# Patient Record
Sex: FEMALE | Race: WHITE | NOT HISPANIC OR LATINO
[De-identification: names, ages, dates, MRNs, and addresses within clinical notes are randomized per-mention and may not be internally consistent; named-entity substitution may affect disease eponyms.]

---

## 2017-01-09 ENCOUNTER — TRANSCRIPTION ENCOUNTER (OUTPATIENT)
Age: 32
End: 2017-01-09

## 2017-05-25 ENCOUNTER — OUTPATIENT (OUTPATIENT)
Dept: OUTPATIENT SERVICES | Facility: HOSPITAL | Age: 32
LOS: 1 days | End: 2017-05-25
Payer: MEDICAID

## 2017-05-25 DIAGNOSIS — Z3A.00 WEEKS OF GESTATION OF PREGNANCY NOT SPECIFIED: ICD-10-CM

## 2017-05-25 DIAGNOSIS — O26.899 OTHER SPECIFIED PREGNANCY RELATED CONDITIONS, UNSPECIFIED TRIMESTER: ICD-10-CM

## 2017-05-25 PROCEDURE — 99213 OFFICE O/P EST LOW 20 MIN: CPT | Mod: 25

## 2017-05-25 PROCEDURE — 76815 OB US LIMITED FETUS(S): CPT | Mod: 26

## 2017-05-25 RX ADMIN — Medication 12 MILLIGRAM(S): at 20:05

## 2017-05-26 ENCOUNTER — OUTPATIENT (OUTPATIENT)
Dept: OUTPATIENT SERVICES | Facility: HOSPITAL | Age: 32
LOS: 1 days | End: 2017-05-26

## 2017-05-26 DIAGNOSIS — O26.899 OTHER SPECIFIED PREGNANCY RELATED CONDITIONS, UNSPECIFIED TRIMESTER: ICD-10-CM

## 2017-05-26 DIAGNOSIS — Z3A.00 WEEKS OF GESTATION OF PREGNANCY NOT SPECIFIED: ICD-10-CM

## 2017-05-26 RX ADMIN — Medication 12 MILLIGRAM(S): at 18:37

## 2017-07-06 ENCOUNTER — INPATIENT (INPATIENT)
Facility: HOSPITAL | Age: 32
LOS: 1 days | Discharge: HOME | End: 2017-07-08
Attending: SPECIALIST | Admitting: SPECIALIST

## 2017-07-06 ENCOUNTER — TRANSCRIPTION ENCOUNTER (OUTPATIENT)
Age: 32
End: 2017-07-06

## 2017-07-06 VITALS — TEMPERATURE: 98 F | RESPIRATION RATE: 16 BRPM | DIASTOLIC BLOOD PRESSURE: 57 MMHG | SYSTOLIC BLOOD PRESSURE: 111 MMHG

## 2017-07-06 DIAGNOSIS — O26.899 OTHER SPECIFIED PREGNANCY RELATED CONDITIONS, UNSPECIFIED TRIMESTER: ICD-10-CM

## 2017-07-06 DIAGNOSIS — Z3A.00 WEEKS OF GESTATION OF PREGNANCY NOT SPECIFIED: ICD-10-CM

## 2017-07-06 LAB
BASOPHILS # BLD AUTO: 0.03 K/UL — SIGNIFICANT CHANGE UP (ref 0–0.2)
BASOPHILS NFR BLD AUTO: 0.4 % — SIGNIFICANT CHANGE UP (ref 0–2)
BLD GP AB SCN SERPL QL: NEGATIVE — SIGNIFICANT CHANGE UP
EOSINOPHIL # BLD AUTO: 0.01 K/UL — SIGNIFICANT CHANGE UP (ref 0–0.5)
EOSINOPHIL NFR BLD AUTO: 0.1 % — SIGNIFICANT CHANGE UP (ref 0–6)
HCT VFR BLD CALC: 29.9 % — LOW (ref 34.5–45)
HGB BLD-MCNC: 9.9 G/DL — LOW (ref 11.5–15.5)
IMM GRANULOCYTES # BLD AUTO: 0.04 # — SIGNIFICANT CHANGE UP
IMM GRANULOCYTES NFR BLD AUTO: 0.5 % — SIGNIFICANT CHANGE UP (ref 0–1.5)
LYMPHOCYTES # BLD AUTO: 1.37 K/UL — SIGNIFICANT CHANGE UP (ref 1–3.3)
LYMPHOCYTES # BLD AUTO: 17.8 % — SIGNIFICANT CHANGE UP (ref 13–44)
MCHC RBC-ENTMCNC: 29.7 PG — SIGNIFICANT CHANGE UP (ref 27–34)
MCHC RBC-ENTMCNC: 33.1 % — SIGNIFICANT CHANGE UP (ref 32–36)
MCV RBC AUTO: 89.8 FL — SIGNIFICANT CHANGE UP (ref 80–100)
MONOCYTES # BLD AUTO: 0.62 K/UL — SIGNIFICANT CHANGE UP (ref 0–0.9)
MONOCYTES NFR BLD AUTO: 8.1 % — SIGNIFICANT CHANGE UP (ref 2–14)
NEUTROPHILS # BLD AUTO: 5.63 K/UL — SIGNIFICANT CHANGE UP (ref 1.8–7.4)
NEUTROPHILS NFR BLD AUTO: 73.1 % — SIGNIFICANT CHANGE UP (ref 43–77)
NRBC # FLD: 0 — SIGNIFICANT CHANGE UP
PLATELET # BLD AUTO: 196 K/UL — SIGNIFICANT CHANGE UP (ref 150–400)
PMV BLD: 11.6 FL — SIGNIFICANT CHANGE UP (ref 7–13)
RBC # BLD: 3.33 M/UL — LOW (ref 3.8–5.2)
RBC # FLD: 14.1 % — SIGNIFICANT CHANGE UP (ref 10.3–14.5)
RH IG SCN BLD-IMP: POSITIVE — SIGNIFICANT CHANGE UP
WBC # BLD: 7.7 K/UL — SIGNIFICANT CHANGE UP (ref 3.8–10.5)
WBC # FLD AUTO: 7.7 K/UL — SIGNIFICANT CHANGE UP (ref 3.8–10.5)

## 2017-07-06 RX ORDER — DOCUSATE SODIUM 100 MG
100 CAPSULE ORAL
Qty: 0 | Refills: 0 | Status: DISCONTINUED | OUTPATIENT
Start: 2017-07-06 | End: 2017-07-08

## 2017-07-06 RX ORDER — DIPHENHYDRAMINE HCL 50 MG
25 CAPSULE ORAL EVERY 6 HOURS
Qty: 0 | Refills: 0 | Status: DISCONTINUED | OUTPATIENT
Start: 2017-07-06 | End: 2017-07-08

## 2017-07-06 RX ORDER — MAGNESIUM HYDROXIDE 400 MG/1
30 TABLET, CHEWABLE ORAL
Qty: 0 | Refills: 0 | Status: DISCONTINUED | OUTPATIENT
Start: 2017-07-06 | End: 2017-07-08

## 2017-07-06 RX ORDER — OXYCODONE HYDROCHLORIDE 5 MG/1
5 TABLET ORAL
Qty: 0 | Refills: 0 | Status: DISCONTINUED | OUTPATIENT
Start: 2017-07-06 | End: 2017-07-08

## 2017-07-06 RX ORDER — DIBUCAINE 1 %
1 OINTMENT (GRAM) RECTAL EVERY 4 HOURS
Qty: 0 | Refills: 0 | Status: DISCONTINUED | OUTPATIENT
Start: 2017-07-06 | End: 2017-07-08

## 2017-07-06 RX ORDER — HYDROCORTISONE 1 %
1 OINTMENT (GRAM) TOPICAL EVERY 4 HOURS
Qty: 0 | Refills: 0 | Status: DISCONTINUED | OUTPATIENT
Start: 2017-07-06 | End: 2017-07-06

## 2017-07-06 RX ORDER — OXYTOCIN 10 UNIT/ML
41.67 VIAL (ML) INJECTION
Qty: 20 | Refills: 0 | Status: DISCONTINUED | OUTPATIENT
Start: 2017-07-06 | End: 2017-07-07

## 2017-07-06 RX ORDER — ACETAMINOPHEN 500 MG
3 TABLET ORAL
Qty: 0 | Refills: 0 | DISCHARGE
Start: 2017-07-06

## 2017-07-06 RX ORDER — ACETAMINOPHEN 500 MG
975 TABLET ORAL EVERY 6 HOURS
Qty: 0 | Refills: 0 | Status: DISCONTINUED | OUTPATIENT
Start: 2017-07-06 | End: 2017-07-08

## 2017-07-06 RX ORDER — IBUPROFEN 200 MG
600 TABLET ORAL EVERY 6 HOURS
Qty: 0 | Refills: 0 | Status: DISCONTINUED | OUTPATIENT
Start: 2017-07-06 | End: 2017-07-08

## 2017-07-06 RX ORDER — OXYCODONE HYDROCHLORIDE 5 MG/1
5 TABLET ORAL EVERY 4 HOURS
Qty: 0 | Refills: 0 | Status: DISCONTINUED | OUTPATIENT
Start: 2017-07-06 | End: 2017-07-08

## 2017-07-06 RX ORDER — SODIUM CHLORIDE 9 MG/ML
3 INJECTION INTRAMUSCULAR; INTRAVENOUS; SUBCUTANEOUS EVERY 8 HOURS
Qty: 0 | Refills: 0 | Status: DISCONTINUED | OUTPATIENT
Start: 2017-07-06 | End: 2017-07-06

## 2017-07-06 RX ORDER — SIMETHICONE 80 MG/1
80 TABLET, CHEWABLE ORAL EVERY 6 HOURS
Qty: 0 | Refills: 0 | Status: DISCONTINUED | OUTPATIENT
Start: 2017-07-06 | End: 2017-07-08

## 2017-07-06 RX ORDER — PRAMOXINE HYDROCHLORIDE 150 MG/15G
1 AEROSOL, FOAM RECTAL EVERY 4 HOURS
Qty: 0 | Refills: 0 | Status: DISCONTINUED | OUTPATIENT
Start: 2017-07-06 | End: 2017-07-07

## 2017-07-06 RX ORDER — IBUPROFEN 200 MG
600 TABLET ORAL EVERY 6 HOURS
Qty: 0 | Refills: 0 | Status: COMPLETED | OUTPATIENT
Start: 2017-07-06 | End: 2018-06-04

## 2017-07-06 RX ORDER — ACETAMINOPHEN 500 MG
975 TABLET ORAL EVERY 6 HOURS
Qty: 0 | Refills: 0 | Status: COMPLETED | OUTPATIENT
Start: 2017-07-06 | End: 2018-06-04

## 2017-07-06 RX ORDER — PRAMOXINE HYDROCHLORIDE 150 MG/15G
1 AEROSOL, FOAM RECTAL EVERY 4 HOURS
Qty: 0 | Refills: 0 | Status: DISCONTINUED | OUTPATIENT
Start: 2017-07-06 | End: 2017-07-06

## 2017-07-06 RX ORDER — AER TRAVELER 0.5 G/1
1 SOLUTION RECTAL; TOPICAL EVERY 4 HOURS
Qty: 0 | Refills: 0 | Status: DISCONTINUED | OUTPATIENT
Start: 2017-07-06 | End: 2017-07-06

## 2017-07-06 RX ORDER — KETOROLAC TROMETHAMINE 30 MG/ML
30 SYRINGE (ML) INJECTION ONCE
Qty: 0 | Refills: 0 | Status: DISCONTINUED | OUTPATIENT
Start: 2017-07-06 | End: 2017-07-06

## 2017-07-06 RX ORDER — OXYTOCIN 10 UNIT/ML
41.67 VIAL (ML) INJECTION
Qty: 20 | Refills: 0 | Status: DISCONTINUED | OUTPATIENT
Start: 2017-07-06 | End: 2017-07-06

## 2017-07-06 RX ORDER — SODIUM CHLORIDE 9 MG/ML
3 INJECTION INTRAMUSCULAR; INTRAVENOUS; SUBCUTANEOUS EVERY 8 HOURS
Qty: 0 | Refills: 0 | Status: DISCONTINUED | OUTPATIENT
Start: 2017-07-06 | End: 2017-07-07

## 2017-07-06 RX ORDER — HYDROCORTISONE 1 %
1 OINTMENT (GRAM) TOPICAL EVERY 4 HOURS
Qty: 0 | Refills: 0 | Status: DISCONTINUED | OUTPATIENT
Start: 2017-07-06 | End: 2017-07-07

## 2017-07-06 RX ORDER — LANOLIN
1 OINTMENT (GRAM) TOPICAL EVERY 6 HOURS
Qty: 0 | Refills: 0 | Status: DISCONTINUED | OUTPATIENT
Start: 2017-07-06 | End: 2017-07-08

## 2017-07-06 RX ORDER — GLYCERIN ADULT
1 SUPPOSITORY, RECTAL RECTAL AT BEDTIME
Qty: 0 | Refills: 0 | Status: DISCONTINUED | OUTPATIENT
Start: 2017-07-06 | End: 2017-07-08

## 2017-07-06 RX ORDER — TETANUS TOXOID, REDUCED DIPHTHERIA TOXOID AND ACELLULAR PERTUSSIS VACCINE, ADSORBED 5; 2.5; 8; 8; 2.5 [IU]/.5ML; [IU]/.5ML; UG/.5ML; UG/.5ML; UG/.5ML
0.5 SUSPENSION INTRAMUSCULAR ONCE
Qty: 0 | Refills: 0 | Status: DISCONTINUED | OUTPATIENT
Start: 2017-07-06 | End: 2017-07-08

## 2017-07-06 RX ORDER — AER TRAVELER 0.5 G/1
1 SOLUTION RECTAL; TOPICAL EVERY 4 HOURS
Qty: 0 | Refills: 0 | Status: DISCONTINUED | OUTPATIENT
Start: 2017-07-06 | End: 2017-07-08

## 2017-07-06 RX ORDER — DIBUCAINE 1 %
1 OINTMENT (GRAM) RECTAL EVERY 4 HOURS
Qty: 0 | Refills: 0 | Status: DISCONTINUED | OUTPATIENT
Start: 2017-07-06 | End: 2017-07-06

## 2017-07-06 RX ADMIN — Medication 30 MILLIGRAM(S): at 21:05

## 2017-07-06 RX ADMIN — Medication 125 MILLIUNIT(S)/MIN: at 20:56

## 2017-07-06 RX ADMIN — Medication 30 MILLIGRAM(S): at 20:55

## 2017-07-06 RX ADMIN — Medication 975 MILLIGRAM(S): at 23:54

## 2017-07-06 NOTE — DISCHARGE NOTE OB - CARE PROVIDER_API CALL
Nereida Paris (MD), Obstetrics and Gynecology  [address] [place] [id number]  Phone: [phone]  Fax: [phone]

## 2017-07-06 NOTE — DISCHARGE NOTE OB - PATIENT PORTAL LINK FT
“You can access the FollowHealth Patient Portal, offered by [facility], by registering with the following website: [email or web address]”

## 2017-07-06 NOTE — DISCHARGE NOTE OB - MEDICATION SUMMARY - MEDICATIONS TO STOP TAKING
I will STOP taking the medications listed below when I get home from the hospital:    ibuprofen 200 mg oral tablet  -- 3 tab(s) by mouth every 6 hours, As Needed

## 2017-07-06 NOTE — PROVIDER CONTACT NOTE (OTHER) - ASSESSMENT
vss fundus firm lochia moderate oob to void, voided 100cc medicated for pain, passed on blood clot after voiding

## 2017-07-06 NOTE — DISCHARGE NOTE OB - MATERIALS PROVIDED
Guide to Postpartum Care/[facility] [place] Screening Program/Vaccinations/Birth Certificate Instructions/Shaken Baby Prevention Handout

## 2017-07-07 LAB — T PALLIDUM AB TITR SER: NEGATIVE — SIGNIFICANT CHANGE UP

## 2017-07-07 RX ORDER — PRAMOXINE HYDROCHLORIDE 150 MG/15G
1 AEROSOL, FOAM RECTAL EVERY 4 HOURS
Qty: 0 | Refills: 0 | Status: DISCONTINUED | OUTPATIENT
Start: 2017-07-07 | End: 2017-07-08

## 2017-07-07 RX ORDER — HYDROCORTISONE 1 %
1 OINTMENT (GRAM) TOPICAL EVERY 4 HOURS
Qty: 0 | Refills: 0 | Status: DISCONTINUED | OUTPATIENT
Start: 2017-07-07 | End: 2017-07-08

## 2017-07-07 RX ADMIN — Medication 600 MILLIGRAM(S): at 17:32

## 2017-07-07 RX ADMIN — Medication 975 MILLIGRAM(S): at 14:30

## 2017-07-07 RX ADMIN — Medication 975 MILLIGRAM(S): at 06:28

## 2017-07-07 RX ADMIN — Medication 975 MILLIGRAM(S): at 01:00

## 2017-07-07 RX ADMIN — Medication 975 MILLIGRAM(S): at 13:23

## 2017-07-07 RX ADMIN — Medication 975 MILLIGRAM(S): at 07:40

## 2017-07-07 RX ADMIN — Medication 1 TABLET(S): at 13:23

## 2017-07-07 NOTE — PROGRESS NOTE ADULT - SUBJECTIVE AND OBJECTIVE BOX
S: Patient doing well. Minimal lochia. Pain controlled.    O: Vital Signs Last 24 Hrs  T(C): 36.7 (2017 05:49), Max: 37 (2017 22:30)  T(F): 98.1 (2017 05:49), Max: 98.6 (2017 22:30)  HR: 60 (2017 05:49) (60 - 72)  BP: 98/61 (2017 05:49) (97/53 - 116/53)  BP(mean): --  RR: 18 (2017 05:49) (16 - 18)  SpO2: 98% (2017 05:49) (98% - 100%)    Gen: NAD  Abd: soft, NT, ND, fundus firm below umbilicus  Lochia: moderate  Ext: no tenderness    Labs:                        9.9    7.70  )-----------( 196      ( 2017 15:40 )             29.9       A: 32y PPD#1 s/p  doing well.    Plan:Routine care d/c this pm with instructions

## 2017-07-08 VITALS
HEART RATE: 87 BPM | SYSTOLIC BLOOD PRESSURE: 124 MMHG | OXYGEN SATURATION: 99 % | TEMPERATURE: 98 F | DIASTOLIC BLOOD PRESSURE: 76 MMHG | RESPIRATION RATE: 16 BRPM

## 2017-07-08 RX ADMIN — Medication 1 TABLET(S): at 12:12

## 2017-07-08 RX ADMIN — Medication 100 MILLIGRAM(S): at 12:13

## 2017-07-08 RX ADMIN — Medication 600 MILLIGRAM(S): at 05:37

## 2017-07-08 RX ADMIN — Medication 600 MILLIGRAM(S): at 06:28

## 2017-08-23 ENCOUNTER — TRANSCRIPTION ENCOUNTER (OUTPATIENT)
Age: 32
End: 2017-08-23

## 2017-10-25 ENCOUNTER — RESULT REVIEW (OUTPATIENT)
Age: 32
End: 2017-10-25

## 2018-10-25 ENCOUNTER — RESULT REVIEW (OUTPATIENT)
Age: 33
End: 2018-10-25

## 2019-03-05 ENCOUNTER — TRANSCRIPTION ENCOUNTER (OUTPATIENT)
Age: 34
End: 2019-03-05

## 2019-05-28 ENCOUNTER — ASOB RESULT (OUTPATIENT)
Age: 34
End: 2019-05-28

## 2019-05-28 ENCOUNTER — APPOINTMENT (OUTPATIENT)
Dept: ANTEPARTUM | Facility: CLINIC | Age: 34
End: 2019-05-28
Payer: MEDICAID

## 2019-05-28 PROCEDURE — 76801 OB US < 14 WKS SINGLE FETUS: CPT

## 2019-05-28 PROCEDURE — 99201 OFFICE OUTPATIENT NEW 10 MINUTES: CPT | Mod: 25

## 2019-06-26 ENCOUNTER — APPOINTMENT (OUTPATIENT)
Dept: ANTEPARTUM | Facility: CLINIC | Age: 34
End: 2019-06-26
Payer: MEDICAID

## 2019-06-26 ENCOUNTER — ASOB RESULT (OUTPATIENT)
Age: 34
End: 2019-06-26

## 2019-06-26 PROCEDURE — 76817 TRANSVAGINAL US OBSTETRIC: CPT

## 2019-07-09 ENCOUNTER — APPOINTMENT (OUTPATIENT)
Dept: ANTEPARTUM | Facility: CLINIC | Age: 34
End: 2019-07-09
Payer: MEDICAID

## 2019-07-09 ENCOUNTER — ASOB RESULT (OUTPATIENT)
Age: 34
End: 2019-07-09

## 2019-07-09 PROCEDURE — 76811 OB US DETAILED SNGL FETUS: CPT

## 2019-07-09 PROCEDURE — 76817 TRANSVAGINAL US OBSTETRIC: CPT

## 2019-07-23 ENCOUNTER — ASOB RESULT (OUTPATIENT)
Age: 34
End: 2019-07-23

## 2019-07-23 ENCOUNTER — APPOINTMENT (OUTPATIENT)
Dept: ANTEPARTUM | Facility: CLINIC | Age: 34
End: 2019-07-23
Payer: MEDICAID

## 2019-07-23 ENCOUNTER — APPOINTMENT (OUTPATIENT)
Dept: ANTEPARTUM | Facility: CLINIC | Age: 34
End: 2019-07-23

## 2019-07-23 PROCEDURE — 76816 OB US FOLLOW-UP PER FETUS: CPT

## 2019-07-25 ENCOUNTER — APPOINTMENT (OUTPATIENT)
Dept: ANTEPARTUM | Facility: CLINIC | Age: 34
End: 2019-07-25

## 2019-07-26 ENCOUNTER — APPOINTMENT (OUTPATIENT)
Dept: ANTEPARTUM | Facility: CLINIC | Age: 34
End: 2019-07-26

## 2019-09-09 ENCOUNTER — APPOINTMENT (OUTPATIENT)
Dept: MATERNAL FETAL MEDICINE | Facility: CLINIC | Age: 34
End: 2019-09-09
Payer: MEDICAID

## 2019-09-09 ENCOUNTER — ASOB RESULT (OUTPATIENT)
Age: 34
End: 2019-09-09

## 2019-09-09 DIAGNOSIS — O24.419 GESTATIONAL DIABETES MELLITUS IN PREGNANCY, UNSPECIFIED CONTROL: ICD-10-CM

## 2019-09-09 PROCEDURE — G0108 DIAB MANAGE TRN  PER INDIV: CPT

## 2019-09-09 RX ORDER — BLOOD SUGAR DIAGNOSTIC
STRIP MISCELLANEOUS 4 TIMES DAILY
Qty: 1 | Refills: 2 | Status: ACTIVE | COMMUNITY
Start: 2019-09-09 | End: 1900-01-01

## 2019-09-09 RX ORDER — BLOOD-GLUCOSE METER
W/DEVICE KIT MISCELLANEOUS
Qty: 1 | Refills: 0 | Status: ACTIVE | COMMUNITY
Start: 2019-09-09 | End: 1900-01-01

## 2019-09-09 RX ORDER — LANCETS 28 GAUGE
EACH MISCELLANEOUS
Qty: 1 | Refills: 2 | Status: ACTIVE | COMMUNITY
Start: 2019-09-09 | End: 1900-01-01

## 2019-09-23 ENCOUNTER — ASOB RESULT (OUTPATIENT)
Age: 34
End: 2019-09-23

## 2019-09-23 ENCOUNTER — APPOINTMENT (OUTPATIENT)
Dept: ANTEPARTUM | Facility: CLINIC | Age: 34
End: 2019-09-23
Payer: MEDICAID

## 2019-09-23 ENCOUNTER — APPOINTMENT (OUTPATIENT)
Dept: MATERNAL FETAL MEDICINE | Facility: CLINIC | Age: 34
End: 2019-09-23
Payer: MEDICAID

## 2019-09-23 PROCEDURE — 76805 OB US >/= 14 WKS SNGL FETUS: CPT

## 2019-09-23 PROCEDURE — 76819 FETAL BIOPHYS PROFIL W/O NST: CPT

## 2019-09-23 PROCEDURE — 99213 OFFICE O/P EST LOW 20 MIN: CPT | Mod: 25

## 2019-09-23 PROCEDURE — G0108 DIAB MANAGE TRN  PER INDIV: CPT

## 2019-10-14 ENCOUNTER — APPOINTMENT (OUTPATIENT)
Dept: ANTEPARTUM | Facility: CLINIC | Age: 34
End: 2019-10-14

## 2019-10-16 ENCOUNTER — APPOINTMENT (OUTPATIENT)
Dept: ANTEPARTUM | Facility: CLINIC | Age: 34
End: 2019-10-16

## 2019-10-16 ENCOUNTER — APPOINTMENT (OUTPATIENT)
Dept: MATERNAL FETAL MEDICINE | Facility: CLINIC | Age: 34
End: 2019-10-16

## 2019-11-05 ENCOUNTER — APPOINTMENT (OUTPATIENT)
Dept: ANTEPARTUM | Facility: CLINIC | Age: 34
End: 2019-11-05
Payer: MEDICAID

## 2019-11-05 ENCOUNTER — APPOINTMENT (OUTPATIENT)
Dept: MATERNAL FETAL MEDICINE | Facility: CLINIC | Age: 34
End: 2019-11-05
Payer: MEDICAID

## 2019-11-05 ENCOUNTER — ASOB RESULT (OUTPATIENT)
Age: 34
End: 2019-11-05

## 2019-11-05 PROCEDURE — 76819 FETAL BIOPHYS PROFIL W/O NST: CPT

## 2019-11-05 PROCEDURE — 76816 OB US FOLLOW-UP PER FETUS: CPT

## 2019-11-05 PROCEDURE — G0108 DIAB MANAGE TRN  PER INDIV: CPT

## 2019-11-13 ENCOUNTER — EMERGENCY (EMERGENCY)
Facility: HOSPITAL | Age: 34
LOS: 1 days | Discharge: STILL ADMITTED OR ADMINISTRATIVE | End: 2019-11-13
Admitting: EMERGENCY MEDICINE

## 2019-11-13 ENCOUNTER — TRANSCRIPTION ENCOUNTER (OUTPATIENT)
Age: 34
End: 2019-11-13

## 2019-11-13 ENCOUNTER — INPATIENT (INPATIENT)
Facility: HOSPITAL | Age: 34
LOS: 1 days | Discharge: HOME | End: 2019-11-15
Attending: SPECIALIST | Admitting: SPECIALIST

## 2019-11-13 VITALS
SYSTOLIC BLOOD PRESSURE: 112 MMHG | OXYGEN SATURATION: 98 % | HEART RATE: 80 BPM | DIASTOLIC BLOOD PRESSURE: 52 MMHG | TEMPERATURE: 98 F

## 2019-11-13 VITALS — SYSTOLIC BLOOD PRESSURE: 120 MMHG | DIASTOLIC BLOOD PRESSURE: 80 MMHG

## 2019-11-13 LAB
BLD GP AB SCN SERPL QL: NEGATIVE — SIGNIFICANT CHANGE UP
HCT VFR BLD CALC: 32.6 % — LOW (ref 34.5–45)
HGB BLD-MCNC: 11.2 G/DL — LOW (ref 11.5–15.5)
MCHC RBC-ENTMCNC: 33.1 PG — SIGNIFICANT CHANGE UP (ref 27–34)
MCHC RBC-ENTMCNC: 34.4 % — SIGNIFICANT CHANGE UP (ref 32–36)
MCV RBC AUTO: 96.4 FL — SIGNIFICANT CHANGE UP (ref 80–100)
NRBC # FLD: 0 K/UL — SIGNIFICANT CHANGE UP (ref 0–0)
PLATELET # BLD AUTO: 183 K/UL — SIGNIFICANT CHANGE UP (ref 150–400)
PMV BLD: 11.7 FL — SIGNIFICANT CHANGE UP (ref 7–13)
RBC # BLD: 3.38 M/UL — LOW (ref 3.8–5.2)
RBC # FLD: 13.1 % — SIGNIFICANT CHANGE UP (ref 10.3–14.5)
RH IG SCN BLD-IMP: POSITIVE — SIGNIFICANT CHANGE UP
WBC # BLD: 11.55 K/UL — HIGH (ref 3.8–10.5)
WBC # FLD AUTO: 11.55 K/UL — HIGH (ref 3.8–10.5)

## 2019-11-13 RX ORDER — IBUPROFEN 200 MG
600 TABLET ORAL EVERY 6 HOURS
Refills: 0 | Status: COMPLETED | OUTPATIENT
Start: 2019-11-13 | End: 2020-10-11

## 2019-11-13 RX ORDER — LANOLIN
1 OINTMENT (GRAM) TOPICAL EVERY 6 HOURS
Refills: 0 | Status: DISCONTINUED | OUTPATIENT
Start: 2019-11-13 | End: 2019-11-15

## 2019-11-13 RX ORDER — OXYTOCIN 10 UNIT/ML
10 VIAL (ML) INJECTION ONCE
Refills: 0 | Status: COMPLETED | OUTPATIENT
Start: 2019-11-13 | End: 2019-11-13

## 2019-11-13 RX ORDER — GLYCERIN ADULT
1 SUPPOSITORY, RECTAL RECTAL AT BEDTIME
Refills: 0 | Status: DISCONTINUED | OUTPATIENT
Start: 2019-11-13 | End: 2019-11-15

## 2019-11-13 RX ORDER — IBUPROFEN 200 MG
600 TABLET ORAL EVERY 6 HOURS
Refills: 0 | Status: DISCONTINUED | OUTPATIENT
Start: 2019-11-13 | End: 2019-11-15

## 2019-11-13 RX ORDER — HYDROCORTISONE 1 %
1 OINTMENT (GRAM) TOPICAL EVERY 6 HOURS
Refills: 0 | Status: DISCONTINUED | OUTPATIENT
Start: 2019-11-13 | End: 2019-11-15

## 2019-11-13 RX ORDER — PRAMOXINE HYDROCHLORIDE 150 MG/15G
1 AEROSOL, FOAM RECTAL EVERY 4 HOURS
Refills: 0 | Status: DISCONTINUED | OUTPATIENT
Start: 2019-11-13 | End: 2019-11-15

## 2019-11-13 RX ORDER — TETANUS TOXOID, REDUCED DIPHTHERIA TOXOID AND ACELLULAR PERTUSSIS VACCINE, ADSORBED 5; 2.5; 8; 8; 2.5 [IU]/.5ML; [IU]/.5ML; UG/.5ML; UG/.5ML; UG/.5ML
0.5 SUSPENSION INTRAMUSCULAR ONCE
Refills: 0 | Status: DISCONTINUED | OUTPATIENT
Start: 2019-11-13 | End: 2019-11-15

## 2019-11-13 RX ORDER — MAGNESIUM HYDROXIDE 400 MG/1
30 TABLET, CHEWABLE ORAL
Refills: 0 | Status: DISCONTINUED | OUTPATIENT
Start: 2019-11-13 | End: 2019-11-15

## 2019-11-13 RX ORDER — OXYCODONE HYDROCHLORIDE 5 MG/1
5 TABLET ORAL
Refills: 0 | Status: DISCONTINUED | OUTPATIENT
Start: 2019-11-13 | End: 2019-11-15

## 2019-11-13 RX ORDER — SIMETHICONE 80 MG/1
80 TABLET, CHEWABLE ORAL EVERY 4 HOURS
Refills: 0 | Status: DISCONTINUED | OUTPATIENT
Start: 2019-11-13 | End: 2019-11-15

## 2019-11-13 RX ORDER — OXYCODONE HYDROCHLORIDE 5 MG/1
5 TABLET ORAL ONCE
Refills: 0 | Status: DISCONTINUED | OUTPATIENT
Start: 2019-11-13 | End: 2019-11-15

## 2019-11-13 RX ORDER — OXYTOCIN 10 UNIT/ML
333.33 VIAL (ML) INJECTION
Qty: 20 | Refills: 0 | Status: DISCONTINUED | OUTPATIENT
Start: 2019-11-13 | End: 2019-11-14

## 2019-11-13 RX ORDER — DIBUCAINE 1 %
1 OINTMENT (GRAM) RECTAL EVERY 6 HOURS
Refills: 0 | Status: DISCONTINUED | OUTPATIENT
Start: 2019-11-13 | End: 2019-11-15

## 2019-11-13 RX ORDER — AER TRAVELER 0.5 G/1
1 SOLUTION RECTAL; TOPICAL EVERY 4 HOURS
Refills: 0 | Status: DISCONTINUED | OUTPATIENT
Start: 2019-11-13 | End: 2019-11-15

## 2019-11-13 RX ORDER — DIPHENHYDRAMINE HCL 50 MG
25 CAPSULE ORAL EVERY 6 HOURS
Refills: 0 | Status: DISCONTINUED | OUTPATIENT
Start: 2019-11-13 | End: 2019-11-15

## 2019-11-13 RX ORDER — KETOROLAC TROMETHAMINE 30 MG/ML
30 SYRINGE (ML) INJECTION ONCE
Refills: 0 | Status: DISCONTINUED | OUTPATIENT
Start: 2019-11-13 | End: 2019-11-14

## 2019-11-13 RX ORDER — BENZOCAINE 10 %
1 GEL (GRAM) MUCOUS MEMBRANE EVERY 6 HOURS
Refills: 0 | Status: DISCONTINUED | OUTPATIENT
Start: 2019-11-13 | End: 2019-11-15

## 2019-11-13 RX ORDER — ACETAMINOPHEN 500 MG
975 TABLET ORAL
Refills: 0 | Status: DISCONTINUED | OUTPATIENT
Start: 2019-11-13 | End: 2019-11-15

## 2019-11-13 RX ORDER — SODIUM CHLORIDE 9 MG/ML
3 INJECTION INTRAMUSCULAR; INTRAVENOUS; SUBCUTANEOUS EVERY 8 HOURS
Refills: 0 | Status: DISCONTINUED | OUTPATIENT
Start: 2019-11-13 | End: 2019-11-15

## 2019-11-13 RX ADMIN — Medication 975 MILLIGRAM(S): at 20:18

## 2019-11-13 RX ADMIN — Medication 975 MILLIGRAM(S): at 20:51

## 2019-11-13 RX ADMIN — Medication 10 UNIT(S): at 18:58

## 2019-11-13 NOTE — DISCHARGE NOTE OB - CARE PROVIDER_API CALL
Nereida Paris)  Obstetrics and Gynecology  [address] [place] [address]  Phone: [phone]  Fax: [phone]  Follow Up Time:

## 2019-11-13 NOTE — DISCHARGE NOTE OB - PATIENT PORTAL LINK FT
You can access the FollowMyHealth Patient Portal offered by [facility] by registering at the following website: [email or web address]. By joining "[employer]’s FollowMyHealth portal, you will also be able to view your health information using other applications (apps) compatible with our system.

## 2019-11-13 NOTE — DISCHARGE NOTE OB - MATERIALS PROVIDED
[facility] [place] Screening Program/Back To Sleep Handout/Discharge Medication Information for Patients and Families Pocket Guide/  Immunization Record/Breastfeeding Log/Shaken Baby Prevention Handout/Breastfeeding Guide and Packet/Birth Certificate Instructions/Breastfeeding Mother’s Support Group Information/Guide to Postpartum Care/[facility] Hearing Screen Program

## 2019-11-13 NOTE — OB NEONATOLOGY/PEDIATRICIAN DELIVERY SUMMARY - NSPEDSNEONOTESA_OBGYN_ALL_OB_FT
Baby boy GA 36.5 wks via  to  33 yo  B+ mother. No significant maternal or prenatal history. PNL NR/immune/neg. GBS neg  SROM clear at 1700 on . Maternal Tmax unkown. Baby emerged vigorous and crying. Was W/D/SS with Apgars 9,9. Mom would like to breast feed. Consents to Hep B. no circ requested.    This baby was code 100 called to the ED. Mom brought up straight up to L&D and delivered via . No complication during delivery.    :    TOB: 18[birth year]    ADOD:  11/15    PMD:  Dr. Yarely Cerrato Baby boy GA 36.5 wks via  to  33 yo  B+ mother. No significant maternal or prenatal history. PNL NR/immune/neg. GBS neg  SROM clear at 1700 on . Maternal Tmax unknown. Baby emerged vigorous and crying. Was W/D/SS with Apgars 9,9. Mom would like to breast feed. No circ requested. Mom is unsure if she wants Hep B.      code 100 called to the ED for this delivery. Mom brought up straight up to L&D and delivered via  in L&D. No complication during delivery.    :    TOB: 1856    ADOD:  11/15    PMD:  Dr. Yarely Morris

## 2019-11-13 NOTE — OB PROVIDER DELIVERY SUMMARY - NSPROVIDERDELIVERYNOTE_OBGYN_ALL_OB_FT
pt arrived to l and d fd and pushing.  immediate delivery of viable male; spont cry, peds at delivery

## 2019-11-13 NOTE — OB RN DELIVERY SUMMARY - NS_SEPSISRSKCALC_OBGYN_ALL_OB_FT
No temperature has been documented for this patient in CPN or on the OB Flowsheet. Ensure the highest temperature during labor was documented on the OB Flowsheet. EOS calculated successfully. EOS Risk Factor: 0.5/1000 live births ([facility] national incidence); GA=36w2d; Temp=97.5; ROM=1.683; GBS='Negative'; Antibiotics='No antibiotics or any antibiotics < 2 hrs prior to birth'

## 2019-11-14 LAB — T PALLIDUM AB TITR SER: NEGATIVE — SIGNIFICANT CHANGE UP

## 2019-11-14 RX ADMIN — Medication 975 MILLIGRAM(S): at 04:41

## 2019-11-14 RX ADMIN — Medication 975 MILLIGRAM(S): at 10:25

## 2019-11-14 RX ADMIN — Medication 600 MILLIGRAM(S): at 17:09

## 2019-11-14 RX ADMIN — Medication 975 MILLIGRAM(S): at 05:23

## 2019-11-14 RX ADMIN — Medication 1 TABLET(S): at 09:12

## 2019-11-14 RX ADMIN — Medication 975 MILLIGRAM(S): at 09:46

## 2019-11-14 RX ADMIN — Medication 600 MILLIGRAM(S): at 09:47

## 2019-11-14 RX ADMIN — Medication 600 MILLIGRAM(S): at 01:19

## 2019-11-14 RX ADMIN — Medication 600 MILLIGRAM(S): at 09:12

## 2019-11-14 RX ADMIN — Medication 600 MILLIGRAM(S): at 02:00

## 2019-11-14 NOTE — PROGRESS NOTE ADULT - SUBJECTIVE AND OBJECTIVE BOX
S: Patient doing well. Minimal lochia. Pain controlled.    O: Vital Signs Last 24 Hrs  T(C): 36.7 (2019 05:56), Max: 37 (2019 23:00)  T(F): 98.1 (2019 05:56), Max: 98.6 (2019 23:00)  HR: 64 (2019 05:56) (61 - 109)  BP: 101/52 (2019 05:56) (89/41 - 162/64)  BP(mean): --  RR: 17 (2019 05:56) (12 - 17)  SpO2: 99% (2019 05:56) (97% - 100%)    Gen: NAD  Abd: soft, NT, ND, fundus firm below umbilicus  Lochia: moderate  Ext: no tenderness    Labs:                        11.2   11.55 )-----------( 183      ( 2019 19:20 )             32.6       A: 34y PPD#1 s/p  doing well.  Plan: Routine care DC with instructions for 11/15

## 2019-11-14 NOTE — LACTATION INITIAL EVALUATION - INTERVENTION OUTCOME
nbn  sliding  off left  nipple  . nbn nursing  deeper  on rt  nipple  but  also  slides  off after  few minutes  . enclouraged  to support  breast  and  bring  nbn  in  deep after  wide open  mouth .   and mother  expressing  and  giving  expressed   after  feedings./verbalizes understanding

## 2019-11-14 NOTE — LACTATION INITIAL EVALUATION - LACTATION INTERVENTIONS
initiate skin to skin/initiate dual electric pump routine/assisted with deep latch and positioning .discussed  signs  of  effective  feeding and  swallowing.  discussed  compression at  breast when  nbn  stops  drinking  and  is  still sucking.  instructed  to offer both  breast at a feeding ,feed on cue and safe  skin to skin.   reviewed late   behavior. instructed  to feed  expressed  milkafter  feeding  as nbn  not  effecticve  at  latch

## 2019-11-15 VITALS
DIASTOLIC BLOOD PRESSURE: 54 MMHG | TEMPERATURE: 98 F | RESPIRATION RATE: 17 BRPM | OXYGEN SATURATION: 99 % | SYSTOLIC BLOOD PRESSURE: 104 MMHG | HEART RATE: 55 BPM

## 2019-11-15 RX ADMIN — Medication 975 MILLIGRAM(S): at 08:15

## 2019-11-15 RX ADMIN — Medication 975 MILLIGRAM(S): at 07:17

## 2019-11-25 ENCOUNTER — APPOINTMENT (OUTPATIENT)
Dept: MATERNAL FETAL MEDICINE | Facility: CLINIC | Age: 34
End: 2019-11-25

## 2019-11-25 ENCOUNTER — APPOINTMENT (OUTPATIENT)
Dept: ANTEPARTUM | Facility: CLINIC | Age: 34
End: 2019-11-25

## 2020-07-31 ENCOUNTER — RESULT REVIEW (OUTPATIENT)
Age: 35
End: 2020-07-31

## 2020-08-20 ENCOUNTER — APPOINTMENT (OUTPATIENT)
Dept: ANTEPARTUM | Facility: CLINIC | Age: 35
End: 2020-08-20

## 2020-09-29 ENCOUNTER — ASOB RESULT (OUTPATIENT)
Age: 35
End: 2020-09-29

## 2020-09-29 ENCOUNTER — APPOINTMENT (OUTPATIENT)
Dept: ANTEPARTUM | Facility: CLINIC | Age: 35
End: 2020-09-29
Payer: MEDICAID

## 2020-09-29 PROCEDURE — 99204 OFFICE O/P NEW MOD 45 MIN: CPT | Mod: 95

## 2020-10-07 ENCOUNTER — ASOB RESULT (OUTPATIENT)
Age: 35
End: 2020-10-07

## 2020-10-07 ENCOUNTER — APPOINTMENT (OUTPATIENT)
Dept: ANTEPARTUM | Facility: CLINIC | Age: 35
End: 2020-10-07
Payer: MEDICAID

## 2020-10-07 PROCEDURE — 76817 TRANSVAGINAL US OBSTETRIC: CPT

## 2020-10-07 PROCEDURE — 76815 OB US LIMITED FETUS(S): CPT

## 2020-10-27 ENCOUNTER — APPOINTMENT (OUTPATIENT)
Dept: ANTEPARTUM | Facility: CLINIC | Age: 35
End: 2020-10-27
Payer: MEDICAID

## 2020-10-27 ENCOUNTER — ASOB RESULT (OUTPATIENT)
Age: 35
End: 2020-10-27

## 2020-10-27 ENCOUNTER — APPOINTMENT (OUTPATIENT)
Dept: ANTEPARTUM | Facility: CLINIC | Age: 35
End: 2020-10-27

## 2020-10-27 PROCEDURE — 76817 TRANSVAGINAL US OBSTETRIC: CPT

## 2020-10-27 PROCEDURE — 99072 ADDL SUPL MATRL&STAF TM PHE: CPT

## 2020-10-27 PROCEDURE — 76811 OB US DETAILED SNGL FETUS: CPT

## 2020-11-24 ENCOUNTER — ASOB RESULT (OUTPATIENT)
Age: 35
End: 2020-11-24

## 2020-11-24 ENCOUNTER — APPOINTMENT (OUTPATIENT)
Dept: ANTEPARTUM | Facility: CLINIC | Age: 35
End: 2020-11-24
Payer: MEDICAID

## 2020-11-24 PROCEDURE — 76816 OB US FOLLOW-UP PER FETUS: CPT

## 2020-12-10 ENCOUNTER — ASOB RESULT (OUTPATIENT)
Age: 35
End: 2020-12-10

## 2020-12-10 ENCOUNTER — APPOINTMENT (OUTPATIENT)
Dept: MATERNAL FETAL MEDICINE | Facility: CLINIC | Age: 35
End: 2020-12-10
Payer: MEDICAID

## 2020-12-10 PROCEDURE — G0108 DIAB MANAGE TRN  PER INDIV: CPT | Mod: 95

## 2020-12-10 RX ORDER — BLOOD-GLUCOSE METER
KIT MISCELLANEOUS 4 TIMES DAILY
Qty: 2 | Refills: 2 | Status: ACTIVE | COMMUNITY
Start: 2020-12-10 | End: 1900-01-01

## 2020-12-10 RX ORDER — BLOOD-GLUCOSE METER
W/DEVICE KIT MISCELLANEOUS
Qty: 1 | Refills: 0 | Status: ACTIVE | COMMUNITY
Start: 2020-12-10 | End: 1900-01-01

## 2020-12-10 RX ORDER — ISOPROPYL ALCOHOL 0.7 ML/ML
SWAB TOPICAL
Qty: 2 | Refills: 2 | Status: ACTIVE | COMMUNITY
Start: 2020-12-10 | End: 1900-01-01

## 2020-12-10 RX ORDER — LANCETS 33 GAUGE
EACH MISCELLANEOUS
Qty: 2 | Refills: 2 | Status: ACTIVE | COMMUNITY
Start: 2020-12-10 | End: 1900-01-01

## 2020-12-23 ENCOUNTER — APPOINTMENT (OUTPATIENT)
Dept: ANTEPARTUM | Facility: CLINIC | Age: 35
End: 2020-12-23

## 2020-12-24 ENCOUNTER — APPOINTMENT (OUTPATIENT)
Dept: MATERNAL FETAL MEDICINE | Facility: CLINIC | Age: 35
End: 2020-12-24

## 2020-12-28 ENCOUNTER — ASOB RESULT (OUTPATIENT)
Age: 35
End: 2020-12-28

## 2020-12-28 ENCOUNTER — APPOINTMENT (OUTPATIENT)
Dept: ANTEPARTUM | Facility: CLINIC | Age: 35
End: 2020-12-28
Payer: MEDICAID

## 2020-12-28 PROCEDURE — 76816 OB US FOLLOW-UP PER FETUS: CPT

## 2020-12-28 PROCEDURE — 99072 ADDL SUPL MATRL&STAF TM PHE: CPT

## 2020-12-28 PROCEDURE — 76819 FETAL BIOPHYS PROFIL W/O NST: CPT

## 2020-12-30 ENCOUNTER — APPOINTMENT (OUTPATIENT)
Dept: MATERNAL FETAL MEDICINE | Facility: CLINIC | Age: 35
End: 2020-12-30
Payer: MEDICAID

## 2020-12-30 ENCOUNTER — ASOB RESULT (OUTPATIENT)
Age: 35
End: 2020-12-30

## 2020-12-30 PROCEDURE — G0108 DIAB MANAGE TRN  PER INDIV: CPT | Mod: 95

## 2020-12-31 ENCOUNTER — OUTPATIENT (OUTPATIENT)
Dept: OUTPATIENT SERVICES | Facility: HOSPITAL | Age: 35
LOS: 1 days | End: 2020-12-31

## 2020-12-31 VITALS — DIASTOLIC BLOOD PRESSURE: 59 MMHG | HEART RATE: 108 BPM | SYSTOLIC BLOOD PRESSURE: 109 MMHG

## 2020-12-31 VITALS — SYSTOLIC BLOOD PRESSURE: 105 MMHG | HEART RATE: 108 BPM | DIASTOLIC BLOOD PRESSURE: 52 MMHG

## 2020-12-31 DIAGNOSIS — Z3A.00 WEEKS OF GESTATION OF PREGNANCY NOT SPECIFIED: ICD-10-CM

## 2020-12-31 DIAGNOSIS — Z04.9 ENCOUNTER FOR EXAMINATION AND OBSERVATION FOR UNSPECIFIED REASON: ICD-10-CM

## 2020-12-31 DIAGNOSIS — O26.899 OTHER SPECIFIED PREGNANCY RELATED CONDITIONS, UNSPECIFIED TRIMESTER: ICD-10-CM

## 2020-12-31 DIAGNOSIS — Z98.890 OTHER SPECIFIED POSTPROCEDURAL STATES: Chronic | ICD-10-CM

## 2020-12-31 LAB
APPEARANCE UR: CLEAR — SIGNIFICANT CHANGE UP
BACTERIA # UR AUTO: NEGATIVE — SIGNIFICANT CHANGE UP
BILIRUB UR-MCNC: NEGATIVE — SIGNIFICANT CHANGE UP
COLOR SPEC: YELLOW — SIGNIFICANT CHANGE UP
DIFF PNL FLD: ABNORMAL
EPI CELLS # UR: 3 /HPF — SIGNIFICANT CHANGE UP (ref 0–5)
GLUCOSE UR QL: ABNORMAL
HYALINE CASTS # UR AUTO: 0 /LPF — SIGNIFICANT CHANGE UP (ref 0–7)
KETONES UR-MCNC: ABNORMAL
LEUKOCYTE ESTERASE UR-ACNC: ABNORMAL
NITRITE UR-MCNC: NEGATIVE — SIGNIFICANT CHANGE UP
PH UR: 6 — SIGNIFICANT CHANGE UP (ref 5–8)
PROT UR-MCNC: ABNORMAL
RBC CASTS # UR COMP ASSIST: 4 /HPF — SIGNIFICANT CHANGE UP (ref 0–4)
SP GR SPEC: 1.03 — HIGH (ref 1.01–1.02)
UROBILINOGEN FLD QL: ABNORMAL
WBC UR QL: 6 /HPF — HIGH (ref 0–5)

## 2020-12-31 RX ORDER — ACETAMINOPHEN 500 MG
975 TABLET ORAL ONCE
Refills: 0 | Status: DISCONTINUED | OUTPATIENT
Start: 2020-12-31 | End: 2020-12-31

## 2020-12-31 NOTE — OB PROVIDER TRIAGE NOTE - NSOBPROVIDERNOTE_OBGYN_ALL_OB_FT
Vital Signs Last 24 Hrs  T(C): 37.4 (31 Dec 2020 21:09), Max: 37.4 (31 Dec 2020 21:09)  T(F): 99.3 (31 Dec 2020 21:09), Max: 99.3 (31 Dec 2020 21:09)  HR: 108 (31 Dec 2020 22:39) (100 - 108)  BP: 105/52 (31 Dec 2020 22:39) (105/51 - 109/59)  BP(mean): --  RR: 16 (31 Dec 2020 21:09) (16 - 16)  SpO2: --    General: A&O x3  Abdomen: soft, non tender  TAS: BPP 8/8, ROWDY 10.45cm, posterior placenta, vertex presentation  SSE:   cervix appears closed and long  scant leukorrhea noted   TVS: 3.13-3.22cm, no funneling or dynamic changes    NST reactive with moderate variability, cat 1   toco no ctx noted     d/w with Dr Marques  Maternal and fetal status reassuring   No evidence of  labor  Plan:  -Patient cleared for discharge  -Patient will follow up with next scheduled appointment   -Urine culture pending, will follow up with any positive results  - labor precautions reviewed  -Fetal kick counts reviewed  -Patient to increase hydration  -Written and verbal instructions given to patient, patient verbalizes understanding of all information given

## 2020-12-31 NOTE — OB PROVIDER TRIAGE NOTE - NS_OBGYNHISTORY_OBGYN_ALL_OB_FT
OB:   07/10/2014 @ 35.6 weeks PPROM 5#6   2015 FT 7#   2017 FT 7#7   2019 @ 36 weeks PPROM 6#  SAB x3 D&C x1  GYN: son

## 2020-12-31 NOTE — OB PROVIDER TRIAGE NOTE - NSHPLABSRESULTS_GEN_ALL_CORE
Urinalysis Basic - ( 31 Dec 2020 22:28 )    Color: Yellow / Appearance: Clear / S.027 / pH: x  Gluc: x / Ketone: Small  / Bili: Negative / Urobili: 3 mg/dL   Blood: x / Protein: Trace / Nitrite: Negative   Leuk Esterase: Moderate / RBC: 4 /HPF / WBC 6 /HPF   Sq Epi: x / Non Sq Epi: 3 /HPF / Bacteria: Negative    reviewed with Dr Marques

## 2020-12-31 NOTE — OB PROVIDER TRIAGE NOTE - NSHPPHYSICALEXAM_GEN_ALL_CORE
Vital Signs Last 24 Hrs  T(C): 37.4 (31 Dec 2020 21:09), Max: 37.4 (31 Dec 2020 21:09)  T(F): 99.3 (31 Dec 2020 21:09), Max: 99.3 (31 Dec 2020 21:09)  HR: 108 (31 Dec 2020 22:39) (100 - 108)  BP: 105/52 (31 Dec 2020 22:39) (105/51 - 109/59)  BP(mean): --  RR: 16 (31 Dec 2020 21:09) (16 - 16)  SpO2: --    General: A&O x3  Abdomen: soft, non tender  TAS: BPP 8/8, ROWDY 10.45cm, posterior placenta, vertex presentation  SSE:   cervix appears closed and long  scant leukorrhea noted   TVS: 3.13-3.22cm, no funneling or dynamic changes    NST reactive with moderate variability, cat 1   toco no ctx noted

## 2020-12-31 NOTE — OB PROVIDER TRIAGE NOTE - HISTORY OF PRESENT ILLNESS
36 y/o pt 29.6 weeks  presents to triage with c/o increased pelvic pressure since this morning. pt denies any LOF, bleeding or contractions. pt denies any dysuria or hematuria. pt denies any recent intercourse or vaginal exam. pt denies n/v/d, fever or chills. pt endorses +fetal movement.   AP complicated by GDMA1    NKDA  PMH: denies  PSH: denies  OB:   07/10/2014 @ 35.6 weeks PPROM 5#6   2015 FT 7#   2017 FT 7#7   2019 @ 36 weeks PPROM 6#  SAB x3 D&C x1  GYN: denies  Social hx: denies  Medications:   PNV  Aspirin 81mg

## 2021-01-01 LAB
CULTURE RESULTS: SIGNIFICANT CHANGE UP
SPECIMEN SOURCE: SIGNIFICANT CHANGE UP

## 2021-01-07 ENCOUNTER — INPATIENT (INPATIENT)
Facility: HOSPITAL | Age: 36
LOS: 0 days | Discharge: HOME | End: 2021-01-08
Attending: SPECIALIST | Admitting: SPECIALIST

## 2021-01-07 VITALS
DIASTOLIC BLOOD PRESSURE: 51 MMHG | RESPIRATION RATE: 16 BRPM | TEMPERATURE: 99 F | HEART RATE: 75 BPM | SYSTOLIC BLOOD PRESSURE: 107 MMHG

## 2021-01-07 DIAGNOSIS — Z98.890 OTHER SPECIFIED POSTPROCEDURAL STATES: Chronic | ICD-10-CM

## 2021-01-07 DIAGNOSIS — Z3A.00 WEEKS OF GESTATION OF PREGNANCY NOT SPECIFIED: ICD-10-CM

## 2021-01-07 DIAGNOSIS — O60.00 PRETERM LABOR WITHOUT DELIVERY, UNSPECIFIED TRIMESTER: ICD-10-CM

## 2021-01-07 DIAGNOSIS — O24.410 GESTATIONAL DIABETES MELLITUS IN PREGNANCY, DIET CONTROLLED: ICD-10-CM

## 2021-01-07 DIAGNOSIS — O26.899 OTHER SPECIFIED PREGNANCY RELATED CONDITIONS, UNSPECIFIED TRIMESTER: ICD-10-CM

## 2021-01-07 LAB
APPEARANCE UR: CLEAR — SIGNIFICANT CHANGE UP
BACTERIA # UR AUTO: NEGATIVE — SIGNIFICANT CHANGE UP
BASOPHILS # BLD AUTO: 0.02 K/UL — SIGNIFICANT CHANGE UP (ref 0–0.2)
BASOPHILS NFR BLD AUTO: 0.3 % — SIGNIFICANT CHANGE UP (ref 0–2)
BILIRUB UR-MCNC: NEGATIVE — SIGNIFICANT CHANGE UP
BLD GP AB SCN SERPL QL: NEGATIVE — SIGNIFICANT CHANGE UP
COLOR SPEC: SIGNIFICANT CHANGE UP
DIFF PNL FLD: NEGATIVE — SIGNIFICANT CHANGE UP
EOSINOPHIL # BLD AUTO: 0.03 K/UL — SIGNIFICANT CHANGE UP (ref 0–0.5)
EOSINOPHIL NFR BLD AUTO: 0.4 % — SIGNIFICANT CHANGE UP (ref 0–6)
EPI CELLS # UR: 2 /HPF — SIGNIFICANT CHANGE UP (ref 0–5)
GLUCOSE BLDC GLUCOMTR-MCNC: 85 MG/DL — SIGNIFICANT CHANGE UP (ref 70–99)
GLUCOSE UR QL: NEGATIVE — SIGNIFICANT CHANGE UP
HCT VFR BLD CALC: 31.2 % — LOW (ref 34.5–45)
HGB BLD-MCNC: 10.5 G/DL — LOW (ref 11.5–15.5)
HYALINE CASTS # UR AUTO: 1 /LPF — SIGNIFICANT CHANGE UP (ref 0–7)
IANC: 5 K/UL — SIGNIFICANT CHANGE UP (ref 1.5–8.5)
IMM GRANULOCYTES NFR BLD AUTO: 0.5 % — SIGNIFICANT CHANGE UP (ref 0–1.5)
KETONES UR-MCNC: ABNORMAL
LEUKOCYTE ESTERASE UR-ACNC: ABNORMAL
LYMPHOCYTES # BLD AUTO: 2.11 K/UL — SIGNIFICANT CHANGE UP (ref 1–3.3)
LYMPHOCYTES # BLD AUTO: 27.7 % — SIGNIFICANT CHANGE UP (ref 13–44)
MCHC RBC-ENTMCNC: 32.3 PG — SIGNIFICANT CHANGE UP (ref 27–34)
MCHC RBC-ENTMCNC: 33.7 GM/DL — SIGNIFICANT CHANGE UP (ref 32–36)
MCV RBC AUTO: 96 FL — SIGNIFICANT CHANGE UP (ref 80–100)
MONOCYTES # BLD AUTO: 0.43 K/UL — SIGNIFICANT CHANGE UP (ref 0–0.9)
MONOCYTES NFR BLD AUTO: 5.6 % — SIGNIFICANT CHANGE UP (ref 2–14)
NEUTROPHILS # BLD AUTO: 5 K/UL — SIGNIFICANT CHANGE UP (ref 1.8–7.4)
NEUTROPHILS NFR BLD AUTO: 65.5 % — SIGNIFICANT CHANGE UP (ref 43–77)
NITRITE UR-MCNC: NEGATIVE — SIGNIFICANT CHANGE UP
NRBC # BLD: 0 /100 WBCS — SIGNIFICANT CHANGE UP
NRBC # FLD: 0 K/UL — SIGNIFICANT CHANGE UP
PH UR: 6 — SIGNIFICANT CHANGE UP (ref 5–8)
PLATELET # BLD AUTO: 235 K/UL — SIGNIFICANT CHANGE UP (ref 150–400)
PROT UR-MCNC: NEGATIVE — SIGNIFICANT CHANGE UP
RBC # BLD: 3.25 M/UL — LOW (ref 3.8–5.2)
RBC # FLD: 12.7 % — SIGNIFICANT CHANGE UP (ref 10.3–14.5)
RBC CASTS # UR COMP ASSIST: 1 /HPF — SIGNIFICANT CHANGE UP (ref 0–4)
RH IG SCN BLD-IMP: POSITIVE — SIGNIFICANT CHANGE UP
SP GR SPEC: 1.01 — SIGNIFICANT CHANGE UP (ref 1.01–1.02)
UROBILINOGEN FLD QL: SIGNIFICANT CHANGE UP
WBC # BLD: 7.63 K/UL — SIGNIFICANT CHANGE UP (ref 3.8–10.5)
WBC # FLD AUTO: 7.63 K/UL — SIGNIFICANT CHANGE UP (ref 3.8–10.5)
WBC UR QL: 4 /HPF — SIGNIFICANT CHANGE UP (ref 0–5)

## 2021-01-07 RX ORDER — SODIUM CHLORIDE 9 MG/ML
3 INJECTION INTRAMUSCULAR; INTRAVENOUS; SUBCUTANEOUS EVERY 8 HOURS
Refills: 0 | Status: DISCONTINUED | OUTPATIENT
Start: 2021-01-07 | End: 2021-01-08

## 2021-01-07 RX ORDER — OXYTOCIN 10 UNIT/ML
333.33 VIAL (ML) INJECTION
Qty: 20 | Refills: 0 | Status: DISCONTINUED | OUTPATIENT
Start: 2021-01-07 | End: 2021-01-08

## 2021-01-07 RX ORDER — AMPICILLIN TRIHYDRATE 250 MG
1 CAPSULE ORAL EVERY 4 HOURS
Refills: 0 | Status: DISCONTINUED | OUTPATIENT
Start: 2021-01-07 | End: 2021-01-08

## 2021-01-07 RX ORDER — MAGNESIUM SULFATE 500 MG/ML
4 VIAL (ML) INJECTION ONCE
Refills: 0 | Status: COMPLETED | OUTPATIENT
Start: 2021-01-07 | End: 2021-01-07

## 2021-01-07 RX ORDER — AMPICILLIN TRIHYDRATE 250 MG
1 CAPSULE ORAL EVERY 4 HOURS
Refills: 0 | Status: DISCONTINUED | OUTPATIENT
Start: 2021-01-07 | End: 2021-01-07

## 2021-01-07 RX ORDER — MAGNESIUM SULFATE 500 MG/ML
2 VIAL (ML) INJECTION
Qty: 40 | Refills: 0 | Status: DISCONTINUED | OUTPATIENT
Start: 2021-01-07 | End: 2021-01-08

## 2021-01-07 RX ORDER — SODIUM CHLORIDE 9 MG/ML
1000 INJECTION, SOLUTION INTRAVENOUS
Refills: 0 | Status: DISCONTINUED | OUTPATIENT
Start: 2021-01-07 | End: 2021-01-07

## 2021-01-07 RX ORDER — SODIUM CHLORIDE 9 MG/ML
1000 INJECTION, SOLUTION INTRAVENOUS
Refills: 0 | Status: DISCONTINUED | OUTPATIENT
Start: 2021-01-07 | End: 2021-01-08

## 2021-01-07 RX ORDER — AMPICILLIN TRIHYDRATE 250 MG
2 CAPSULE ORAL ONCE
Refills: 0 | Status: COMPLETED | OUTPATIENT
Start: 2021-01-07 | End: 2021-01-07

## 2021-01-07 RX ADMIN — Medication 50 GM/HR: at 22:32

## 2021-01-07 RX ADMIN — Medication 50 GM/HR: at 19:15

## 2021-01-07 RX ADMIN — Medication 12 MILLIGRAM(S): at 16:50

## 2021-01-07 RX ADMIN — SODIUM CHLORIDE 3 MILLILITER(S): 9 INJECTION INTRAMUSCULAR; INTRAVENOUS; SUBCUTANEOUS at 23:00

## 2021-01-07 RX ADMIN — Medication 300 GRAM(S): at 18:51

## 2021-01-07 RX ADMIN — Medication 216 GRAM(S): at 19:20

## 2021-01-07 NOTE — OB PROVIDER H&P - HISTORY OF PRESENT ILLNESS
34 yo  @ 30.6 wks GA sent from OB's office for evaluation of PTL. Patient was in the OB's office - Dr Paris (OB Attending) as per verbal report to Anastasia Amin (CN) patient with HO 2 PTD at 36 wks with an exam today of /-3  and a cervical length @ 2.9 with no funneling or dynamic change. Patient to be evaluated for PTL and  to receive Betamethasone for  steroids x 2 doses (Inpatient vs Outpatient)  PNC: Dr Paris  GDM A2  H/O PTD x 2   36 yo  @ 30.6 wks GA sent from OB's office for evaluation of PTL. Patient was in the OB's office - Dr Paris (OB Attending) as per verbal report to Anastasia Amin (CN) patient with HO 2 PTD at 36 wks with an exam today of /-3  and a cervical length @ 2.9 with no funneling or dynamic change. Patient to be evaluated for PTL and  to receive Betamethasone for  steroids x 2 doses (Inpatient vs Outpatient)  PNC: Dr Paris  GDM A2 (Corrected on 21 - GDM A1 - Diet controlled) MARILYN Sy, NP  H/O PTD x 2

## 2021-01-07 NOTE — OB PROVIDER TRIAGE NOTE - NSOBPROVIDERNOTE_OBGYN_ALL_OB_FT
36 yo  @ 30.6 wks GA presents for Betamethasone ( steroids) r/o PTL  - Betamethasone 12 mg x 1 dose now  [facility]  - IV Lock placed  - Re evaluate @ 1830  - NST continuos  AS Dw Dr Marques (OB Attending)MARILYN Sy, NELSON 34 yo  @ 30.6 wks GA presents for Betamethasone ( steroids) r/o PTL  - Betamethasone 12 mg x 1 dose now  - Routine labs  - IV Lock placed  - Re evaluate @ 1830  - NST continuos    AS Dw Dr Marques (OB Attending)MARILYN Sy, NP  @ 1800 Decision for Admission  due to evidence PTL   Magnesium for neuroprotection,  steroids, COVID Swab, GBS prophylaxis, tocolysis  FS q 6 , Clears as per Dr Marques (Ob Attending) 36 yo  @ 30.6 wks GA presents for Betamethasone ( steroids) r/o PTL  - Betamethasone 12 mg x 1 dose now  - Routine labs  - IV Lock placed  - Re evaluate @ 1830  - NST continuos    AS Dw Dr Marques (OB Attending)MARILYN Sy, NP  @ 1800 Decision for Admission  due to evidence PTL   Magnesium for neuroprotection,  steroids, COVID Swab, GBS prophylaxis, tocolysis  FS q 6 , Clears as per Dr Marques (Ob Attending)    OB Residency Team aware (Dr Rg) PG3

## 2021-01-07 NOTE — OB RN PATIENT PROFILE - NS_OBGYNHISTORY_OBGYN_ALL_OB_FT
Miscarriage  2014, 2016 , and 2018 with d&c    Vaginal delivery        (35wks)  2015  FT  2017  FT  2019  (36wks)

## 2021-01-07 NOTE — OB PROVIDER TRIAGE NOTE - NS_OBGYNHISTORY_OBGYN_ALL_OB_FT
Miscarriage    2016   2018 with d&c  Vaginal delivery    (35wks)     2017   2019  (36wks) Miscarriage  2014, 2016 , and 2018 with d&c    Vaginal delivery        (35wks)  2015  FT  2017  FT  2019  (36wks)

## 2021-01-07 NOTE — OB PROVIDER LABOR PROGRESS NOTE - NS_SUBJECTIVE/OBJECTIVE_OBGYN_ALL_OB_FT
R3 Labor Note    went to assess patient for cervical change    T(C): 37.1 (01-07-21 @ 18:26), Max: 37.3 (01-07-21 @ 15:47)  HR: 99 (01-07-21 @ 21:08) (75 - 109)  BP: 98/48 (01-07-21 @ 21:04) (87/46 - 119/58)  RR: 18 (01-07-21 @ 18:26) (16 - 18)  SpO2: 98% (01-07-21 @ 21:08) (95% - 100%)

## 2021-01-07 NOTE — OB PROVIDER H&P - ASSESSMENT
36 yo  @ 30.6 wks GA presents for Betamethasone ( steroids) r/o PTL  - Betamethasone 12 mg x 1 dose now  - Routine labs  - IV Lock placed  - Re evaluate @ 1830  - NST continuos    AS Dw Dr Marques (OB Attending)MARILYN Sy, NP  @ 1800 Decision for Admission  due to evidence PTL   Magnesium for neuroprotection,  steroids, COVID Swab, GBS prophylaxis, tocolysis  FS q 6 , Clears as per Dr Mraques (Ob Attending)  OB Residency Team Dr Rg (PG3) 34 yo  @ 30.6 wks GA presents for Betamethasone ( steroids) r/o PTL  - Betamethasone 12 mg x 1 dose now  - Routine labs  - IV Lock placed  - Re evaluate @ 1830  - NST continuos    AS Dw Dr Marques (OB Attending)MARILYN Sy NP  @ 1800 Decision for Admission  due to evidence PTL   Magnesium for neuroprotection,  steroids, COVID Swab, GBS prophylaxis, tocolysis  FS q 6 , Clears as per Dr Marques (Ob Attending)  OB Residency Team Dr Rg (PG3) aware (corrected on 21 @ 6419) MARILYN Sy NP

## 2021-01-07 NOTE — OB PROVIDER TRIAGE NOTE - NSHPPHYSICALEXAM_GEN_ALL_CORE
A/O x 3  NAD  Vital Signs Last 24 Hrs  T(C): 37.3 (07 Jan 2021 15:47), Max: 37.3 (07 Jan 2021 15:47)  T(F): 99.1 (07 Jan 2021 15:47), Max: 99.1 (07 Jan 2021 15:47)  HR: 75 (07 Jan 2021 17:09) (75 - 93)  BP: 101/53 (07 Jan 2021 17:09) (87/46 - 119/58)  BP(mean): --  RR: 16 (07 Jan 2021 15:47) (16 - 16)  SpO2: --  Heart: RRR  Lungs: BCTA  Abdomen is soft NT and gravid with no ctx palpable  TVS = 1.8-2.0 ( VTX presentation)  SVE: 2/70/-3 ( Soft multiparous cervix)  FHR: Cat 1 , reactive  TOCO: CTX q 5-10 (unappreciated by patient at this time)

## 2021-01-07 NOTE — OB PROVIDER H&P - CURRENT PREGNANCY COMPLICATIONS, OB PROFILE
GDMA1/Gestational Diabetes/Incompetent Cervix/Cervical Insufficiency/Gestational Age less than 36 Weeks

## 2021-01-07 NOTE — OB PROVIDER TRIAGE NOTE - HISTORY OF PRESENT ILLNESS
36 yo  @ 30.6 wks GA sent from OB's office for evaluation of PTL. Patient was in the OB's office - Dr Paris (OB Attending) as per verbal report to Anastasia Amin (CN) patient with HO 2 PTD at 36 wks with an exam today of /-3  and a cervical length @ 2.9 with no funneling or dynamic change. Patient to be evaluated for PTL and  to receive Betamethasone for  steroids x 2 doses (Inpatient vs Outpatient)  PNC: Dr Paris  GDM A2  H/O PTD x 2

## 2021-01-07 NOTE — OB PROVIDER LABOR PROGRESS NOTE - ASSESSMENT
- VE unchanged since arrival to triage earlier today  - CTX spaced  - c/w BMZ, amp  - discussed option to d/c Mag, patient declined and wishes to continue at this time  - patient stable for transfer to floor with BID monitoring    JASS Cornejo PGY3  d/w Dr. Ornelas

## 2021-01-08 ENCOUNTER — TRANSCRIPTION ENCOUNTER (OUTPATIENT)
Age: 36
End: 2021-01-08

## 2021-01-08 VITALS — HEART RATE: 105 BPM | OXYGEN SATURATION: 97 %

## 2021-01-08 LAB
CULTURE RESULTS: SIGNIFICANT CHANGE UP
GLUCOSE BLDC GLUCOMTR-MCNC: 106 MG/DL — HIGH (ref 70–99)
GLUCOSE BLDC GLUCOMTR-MCNC: 116 MG/DL — HIGH (ref 70–99)
GLUCOSE BLDC GLUCOMTR-MCNC: 123 MG/DL — HIGH (ref 70–99)
GLUCOSE BLDC GLUCOMTR-MCNC: 146 MG/DL — HIGH (ref 70–99)
GLUCOSE BLDC GLUCOMTR-MCNC: 92 MG/DL — SIGNIFICANT CHANGE UP (ref 70–99)
MAGNESIUM SERPL-MCNC: 4.6 MG/DL — HIGH (ref 1.6–2.6)
MAGNESIUM SERPL-MCNC: 5.5 MG/DL — HIGH (ref 1.6–2.6)
SARS-COV-2 IGG SERPL QL IA: POSITIVE
SARS-COV-2 IGM SERPL IA-ACNC: 7.14 INDEX — HIGH
SARS-COV-2 RNA SPEC QL NAA+PROBE: SIGNIFICANT CHANGE UP
SPECIMEN SOURCE: SIGNIFICANT CHANGE UP
T PALLIDUM AB TITR SER: NEGATIVE — SIGNIFICANT CHANGE UP

## 2021-01-08 RX ORDER — ACETAMINOPHEN 500 MG
975 TABLET ORAL ONCE
Refills: 0 | Status: COMPLETED | OUTPATIENT
Start: 2021-01-08 | End: 2021-01-08

## 2021-01-08 RX ORDER — ACETAMINOPHEN 500 MG
975 TABLET ORAL EVERY 6 HOURS
Refills: 0 | Status: DISCONTINUED | OUTPATIENT
Start: 2021-01-08 | End: 2021-01-08

## 2021-01-08 RX ADMIN — Medication 108 GRAM(S): at 05:14

## 2021-01-08 RX ADMIN — Medication 975 MILLIGRAM(S): at 00:43

## 2021-01-08 RX ADMIN — Medication 108 GRAM(S): at 01:07

## 2021-01-08 RX ADMIN — Medication 975 MILLIGRAM(S): at 08:15

## 2021-01-08 RX ADMIN — SODIUM CHLORIDE 3 MILLILITER(S): 9 INJECTION INTRAMUSCULAR; INTRAVENOUS; SUBCUTANEOUS at 05:27

## 2021-01-08 RX ADMIN — Medication 12 MILLIGRAM(S): at 14:08

## 2021-01-08 NOTE — PROGRESS NOTE ADULT - SUBJECTIVE AND OBJECTIVE BOX
R3 Progress note: HD#2     Patient seen and examined at bedside, patient we re-evaluated overnight and made no cervical change. The patient said that her contractions stopped.     Pt reports +FM, but decreased just this morning. She said sometimes this happens in the morning. Denies LOF, VB, ctx, HA, epigastric pain, blurred vision, CP, SOB, N/V, fevers, and chills.    Vital Signs Last 24 Hours  T(C): 36.5 (01-08-21 @ 05:12), Max: 37.3 (01-07-21 @ 15:47)  HR: 81 (01-08-21 @ 06:57) (75 - 109)  BP: 90/55 (01-08-21 @ 05:12) (81/39 - 119/58)  RR: 18 (01-08-21 @ 05:12) (16 - 18)  SpO2: 94% (01-08-21 @ 06:55) (94% - 100%)    CAPILLARY BLOOD GLUCOSE      POCT Blood Glucose.: 123 mg/dL (08 Jan 2021 05:10)  POCT Blood Glucose.: 116 mg/dL (07 Jan 2021 23:08)  POCT Blood Glucose.: 85 mg/dL (07 Jan 2021 16:48)      Physical Exam:  General: NAD  Abdomen: Soft, non-tender, gravid  Ext: No pain or swelling    Labs:             10.5   7.63  )-----------( 235      ( 01-07 @ 17:06 )             31.2       Mg     5.5     01-08 @ 05:31              MEDICATIONS  (STANDING):  ampicillin  IVPB 1 Gram(s) IV Intermittent every 4 hours  betamethasone Injectable 12 milliGRAM(s) IntraMuscular every 24 hours  lactated ringers. 1000 milliLiter(s) (75 mL/Hr) IV Continuous <Continuous>  magnesium sulfate Infusion 2 Gm/Hr (50 mL/Hr) IV Continuous <Continuous>  oxytocin Infusion 333.333 milliUNIT(s)/Min (1000 mL/Hr) IV Continuous <Continuous>  sodium chloride 0.9% lock flush 3 milliLiter(s) IV Push every 8 hours    MEDICATIONS  (PRN):  acetaminophen   Tablet .. 975 milliGRAM(s) Oral every 6 hours PRN Mild Pain (1 - 3)

## 2021-01-08 NOTE — DISCHARGE NOTE ANTEPARTUM - CARE PROVIDER_API CALL
Nereida Paris)  Obstetrics and Gynecology  [address] [place] [address]  Phone: [phone]  Fax: [phone]  Follow Up Time:

## 2021-01-08 NOTE — PROGRESS NOTE ADULT - ASSESSMENT
A/P: Pt is a 36yo  @ 31wks admitted for r/o PTL. Patient was started on BAM yesterday for her contractions. She has stopped sarah and has no made cervical change. Fetal/maternal status reassuring overnight.    #PTL  -Continue BMZ#2   -s/p Amp (-)  -s/p Mg (-)  -s/p Procardia for tocolysis  -ATU sono today   -EFM +[facility] to assess for contractions  -If starts to contract again VE to assess for cervical change  -MOD: Vaginal     #GDMA1  -Monitor FS in setting of recent administration of BMZ  -Will add insulin as needed for FS control  -FS before/after meals and at bedtime    #MWB  -ADA diet  -Monitor VS  -PNV, FE, Folate    #FWB  -NST BID  -ATU sono today    Msantandreu PGY3

## 2021-01-08 NOTE — DISCHARGE NOTE ANTEPARTUM - MEDICATION SUMMARY - MEDICATIONS TO TAKE
I will START or STAY ON the medications listed below when I get home from the hospital:    aspirin 81 mg oral tablet  -- Indication: For home med    Prenatal Multivitamins oral tablet (obsolete)  -- Indication: For home med

## 2021-01-08 NOTE — DISCHARGE NOTE ANTEPARTUM - HOSPITAL COURSE
36yo  at 31wks admitted for r/o PTL with contractions. On admission VE found to be 2/70/-3.  Patient was started on betamethasone course and given Magnesium sulfate, procardia for tocolysis and ampicillin.  Pt has not felt contractions overnight and through today. S/P MgSo4, BMZ, Amp from -. Of note pt with GDMA1, glucose slightly elevated due to BMZ injections, recommended to monitor closely and call MD/ diabetes educator with elevated readings. Pt without cervical changes since admission. Now denies contractions, vaginal bleeding, leaking fluid. Endorses good Fetal movement. Fetal testing reassuring. Pt stable for discharge home with strict PTL precautions and follow up with OB within the week. 36yo  at 31wks admitted for r/o PTL with contractions. On admission VE found to be 2/70/-3.  Patient was started on betamethasone course and given Magnesium sulfate, procardia for tocolysis and ampicillin. BPP /8 from . Pt has not felt contractions overnight and through today. S/P MgSo4, BMZ, Amp from -. Of note pt with GDMA1, glucose slightly elevated due to BMZ injections, recommended to monitor closely and call MD/ diabetes educator with elevated readings. Pt without cervical changes since admission. Now denies contractions, vaginal bleeding, leaking fluid. Endorses good Fetal movement. Fetal testing reassuring. Pt stable for discharge home with strict PTL precautions and follow up with OB within the week.

## 2021-01-08 NOTE — DISCHARGE NOTE ANTEPARTUM - CARE PLAN
Principal Discharge DX:	 contractions  Goal:	Continue prenatal course  Assessment and plan of treatment:	- Follow up with OB within one week  - Continue to monitor glucose and follow up with diabetes educator, Call doctor with elevated readings  - Return with contractions, vaginal bleeding, leaking fluid or decreased fetal movement

## 2021-01-08 NOTE — DISCHARGE NOTE ANTEPARTUM - PATIENT PORTAL LINK FT
You can access the FollowMyHealth Patient Portal offered by [facility] by registering at the following website: [email or web address]. By joining [employer]’s FollowMyHealth portal, you will also be able to view your health information using other applications (apps) compatible with our system.

## 2021-01-08 NOTE — DISCHARGE NOTE ANTEPARTUM - PLAN OF CARE
- Follow up with OB within one week  - Continue to monitor glucose and follow up with diabetes educator, Call doctor with elevated readings  - Return with contractions, vaginal bleeding, leaking fluid or decreased fetal movement Continue prenatal course

## 2021-01-09 LAB
CULTURE RESULTS: SIGNIFICANT CHANGE UP
SPECIMEN SOURCE: SIGNIFICANT CHANGE UP

## 2021-01-13 NOTE — OB PROVIDER H&P - PRESSURE ULCER(S)
No 20 minutes visits available today.  Patient would rather see Dr Perkins than go to urgent care.  Scheduled video visit for tomorrow at 11:00.  Patient verbalized understanding.   no

## 2021-01-20 ENCOUNTER — ASOB RESULT (OUTPATIENT)
Age: 36
End: 2021-01-20

## 2021-01-20 ENCOUNTER — APPOINTMENT (OUTPATIENT)
Dept: MATERNAL FETAL MEDICINE | Facility: CLINIC | Age: 36
End: 2021-01-20
Payer: MEDICAID

## 2021-01-20 PROCEDURE — G0108 DIAB MANAGE TRN  PER INDIV: CPT | Mod: 95

## 2021-01-23 NOTE — DISCHARGE NOTE OB - CARE PROVIDERS DIRECT ADDRESSES
Med was filled on 1/13/21 for 1 month  Pt has upcoming appt on 2/1/21 with YUDI   
(2) good, crying
,DirectAddress_Unknown

## 2021-01-25 ENCOUNTER — ASOB RESULT (OUTPATIENT)
Age: 36
End: 2021-01-25

## 2021-01-25 ENCOUNTER — APPOINTMENT (OUTPATIENT)
Dept: ANTEPARTUM | Facility: CLINIC | Age: 36
End: 2021-01-25
Payer: MEDICAID

## 2021-01-25 PROCEDURE — 76819 FETAL BIOPHYS PROFIL W/O NST: CPT

## 2021-01-25 PROCEDURE — 76816 OB US FOLLOW-UP PER FETUS: CPT

## 2021-01-25 PROCEDURE — 99072 ADDL SUPL MATRL&STAF TM PHE: CPT

## 2021-02-10 ENCOUNTER — APPOINTMENT (OUTPATIENT)
Dept: ANTEPARTUM | Facility: CLINIC | Age: 36
End: 2021-02-10

## 2021-02-10 ENCOUNTER — ASOB RESULT (OUTPATIENT)
Age: 36
End: 2021-02-10

## 2021-02-10 ENCOUNTER — APPOINTMENT (OUTPATIENT)
Dept: MATERNAL FETAL MEDICINE | Facility: CLINIC | Age: 36
End: 2021-02-10
Payer: MEDICAID

## 2021-02-10 PROCEDURE — G0108 DIAB MANAGE TRN  PER INDIV: CPT | Mod: 95

## 2021-02-16 NOTE — OB RN TRIAGE NOTE - NS_TRIAGEINITIALRNASSESSMENT_OBGYN_ALL_OB_FT
In [facility] [place] post total knee replacement follow up call. Home Health has come to the house. Spoke with the daughter since patient does not speak english as well and would like the daughter to be educated. Discussed using ice, distraction, and repositioning to manage pain besides using medication. Reminded patient not to get the wound wet and to cover it before bathing. Reminded patient the importance of doing exercises as shown. Reminded patient to change positions frequently and walking at least 3-4 times each day to promote circulation, decrease stiffness and soreness. Reinforced increased swelling, bruising and pain are normal after surgery when at home. Educated to lie down and raise leg while straight on pillows above heart level to help decrease swelling too. Reminded to healthy eat foods along with drinking plenty of fluids to promote healing. Reminded not  to take medication on an empty stomach to prevent nausea. Reminded to take a stool softener while taking a narcotic due to constipation being a side effect of anesthesia and narcotics. Taking medications as prescribed by provider. In [place] So knows when their follow up appointment is.     Orthopaedic Navigator
YAIMA Gomez

## 2021-02-22 ENCOUNTER — APPOINTMENT (OUTPATIENT)
Dept: ANTEPARTUM | Facility: CLINIC | Age: 36
End: 2021-02-22
Payer: MEDICAID

## 2021-02-22 ENCOUNTER — ASOB RESULT (OUTPATIENT)
Age: 36
End: 2021-02-22

## 2021-02-22 PROCEDURE — 76819 FETAL BIOPHYS PROFIL W/O NST: CPT

## 2021-02-22 PROCEDURE — 99072 ADDL SUPL MATRL&STAF TM PHE: CPT

## 2021-02-22 PROCEDURE — 76816 OB US FOLLOW-UP PER FETUS: CPT

## 2021-02-26 ENCOUNTER — OUTPATIENT (OUTPATIENT)
Dept: INPATIENT UNIT | Facility: HOSPITAL | Age: 36
LOS: 1 days | Discharge: HOME | End: 2021-02-26
Payer: MEDICAID

## 2021-02-26 ENCOUNTER — EMERGENCY (EMERGENCY)
Facility: HOSPITAL | Age: 36
LOS: 1 days | Discharge: STILL ADMITTED OR ADMINISTRATIVE | End: 2021-02-26
Admitting: EMERGENCY MEDICINE
Payer: MEDICAID

## 2021-02-26 VITALS
HEART RATE: 104 BPM | OXYGEN SATURATION: 100 % | TEMPERATURE: 98 F | SYSTOLIC BLOOD PRESSURE: 124 MMHG | RESPIRATION RATE: 18 BRPM | DIASTOLIC BLOOD PRESSURE: 78 MMHG

## 2021-02-26 VITALS
SYSTOLIC BLOOD PRESSURE: 119 MMHG | TEMPERATURE: 87 F | OXYGEN SATURATION: 100 % | HEIGHT: 62 IN | HEART RATE: 100 BPM | RESPIRATION RATE: 19 BRPM | DIASTOLIC BLOOD PRESSURE: 73 MMHG

## 2021-02-26 VITALS
DIASTOLIC BLOOD PRESSURE: 57 MMHG | SYSTOLIC BLOOD PRESSURE: 112 MMHG | TEMPERATURE: 98 F | HEART RATE: 96 BPM | RESPIRATION RATE: 16 BRPM

## 2021-02-26 DIAGNOSIS — Z98.890 OTHER SPECIFIED POSTPROCEDURAL STATES: Chronic | ICD-10-CM

## 2021-02-26 DIAGNOSIS — O26.899 OTHER SPECIFIED PREGNANCY RELATED CONDITIONS, UNSPECIFIED TRIMESTER: ICD-10-CM

## 2021-02-26 DIAGNOSIS — Z3A.00 WEEKS OF GESTATION OF PREGNANCY NOT SPECIFIED: ICD-10-CM

## 2021-02-26 PROCEDURE — L9993: CPT

## 2021-02-26 PROCEDURE — 99214 OFFICE O/P EST MOD 30 MIN: CPT | Mod: 25

## 2021-02-26 PROCEDURE — 59025 FETAL NON-STRESS TEST: CPT | Mod: 26

## 2021-02-26 PROCEDURE — 76816 OB US FOLLOW-UP PER FETUS: CPT | Mod: 26

## 2021-02-26 NOTE — OB PROVIDER TRIAGE NOTE - NSHPPHYSICALEXAM_GEN_ALL_CORE
Vital Signs Last 24 Hrs  T(C): 36.9 (26 Feb 2021 09:57), Max: 36.9 (26 Feb 2021 09:48)  T(F): 98.42 (26 Feb 2021 09:57), Max: 98.5 (26 Feb 2021 09:48)  HR: 87 (26 Feb 2021 10:58) (85 - 104)  BP: 101/55 (26 Feb 2021 10:58) (100/55 - 124/78)  RR: 16 (26 Feb 2021 09:56) (16 - 19)  SpO2: 100% (26 Feb 2021 09:48) (100% - 100%)    AxO x3   Heart: RRR  Lungs: BCTA  Abdomen is soft NT and gravid with no ctx palpable    NST- reactive           FHR: 135          Contractions: none            CAT I  Sono:    presentation: vertex   ROWDY: 7.94   placenta: anterior    BPP 8/8      Speculum Exam:    No pooling     Nitrazine Negative    Ferning negative     Vaginal Exam:    4/70/-2 ( unchanged from VE in office 2/25/2021)

## 2021-02-26 NOTE — ED ADULT TRIAGE NOTE - CHIEF COMPLAINT QUOTE
Pt 37 weeks pregnant-- pt c/o water broke and pt feels she needs to push--pt denies pain but c/o a lot of pressure--Pt to trauma room A for exam

## 2021-02-26 NOTE — OB PROVIDER TRIAGE NOTE - ADDITIONAL INSTRUCTIONS
- pt given labor precautions  - fetal kick count  - increase fluid  - follow up with PCP OB as instructed

## 2021-02-26 NOTE — OB PROVIDER TRIAGE NOTE - PMH
Diet controlled gestational diabetes mellitus (GDM) in second trimester    Miscarriage  2014   2018 with d&c  Vaginal delivery    (35wks)     2017   2019  (36wks)

## 2021-02-26 NOTE — OB PROVIDER TRIAGE NOTE - NS_OBGYNHISTORY_OBGYN_ALL_OB_FT
Miscarriage  ,  , and  with d&c    Vaginal delivery      (35wks)    FT    FT  2019  (36wks)    PNC: Dr Paris  GDM A2 (Corrected on 21 - GDM A1 - Diet controlled)   H/O PTD x 2

## 2021-02-26 NOTE — OB PROVIDER TRIAGE NOTE - HISTORY OF PRESENT ILLNESS
34 y/o  @ 38 weeks presented via ambulance to triage c/o gush of fluid and vaginal pressure while using the bathroom at 0830. Pt states +fm, denies any vb, contractions at the moment. Pt states that her gush happened twice while she was using the bathroom.

## 2021-02-27 ENCOUNTER — TRANSCRIPTION ENCOUNTER (OUTPATIENT)
Age: 36
End: 2021-02-27

## 2021-02-27 ENCOUNTER — INPATIENT (INPATIENT)
Facility: HOSPITAL | Age: 36
LOS: 1 days | Discharge: HOME | End: 2021-03-01
Attending: SPECIALIST | Admitting: SPECIALIST

## 2021-02-27 ENCOUNTER — INPATIENT (INPATIENT)
Facility: HOSPITAL | Age: 36
LOS: 0 days | Discharge: ANOTHER HOSPITAL | End: 2021-02-27
Attending: SPECIALIST | Admitting: SPECIALIST
Payer: MEDICAID

## 2021-02-27 VITALS — HEART RATE: 68 BPM | SYSTOLIC BLOOD PRESSURE: 110 MMHG | DIASTOLIC BLOOD PRESSURE: 59 MMHG

## 2021-02-27 VITALS — SYSTOLIC BLOOD PRESSURE: 96 MMHG | DIASTOLIC BLOOD PRESSURE: 51 MMHG | HEART RATE: 81 BPM

## 2021-02-27 VITALS — HEART RATE: 88 BPM | SYSTOLIC BLOOD PRESSURE: 113 MMHG | DIASTOLIC BLOOD PRESSURE: 52 MMHG

## 2021-02-27 VITALS — HEIGHT: 62 IN

## 2021-02-27 DIAGNOSIS — Z98.890 OTHER SPECIFIED POSTPROCEDURAL STATES: Chronic | ICD-10-CM

## 2021-02-27 LAB
BASOPHILS # BLD AUTO: 0.02 K/UL — SIGNIFICANT CHANGE UP (ref 0–0.2)
BASOPHILS NFR BLD AUTO: 0.2 % — SIGNIFICANT CHANGE UP (ref 0–2)
BLD GP AB SCN SERPL QL: NEGATIVE — SIGNIFICANT CHANGE UP
EOSINOPHIL # BLD AUTO: 0.05 K/UL — SIGNIFICANT CHANGE UP (ref 0–0.5)
EOSINOPHIL NFR BLD AUTO: 0.4 % — SIGNIFICANT CHANGE UP (ref 0–6)
HBV SURFACE AG SERPL QL IA: SIGNIFICANT CHANGE UP
HCT VFR BLD CALC: 33.8 % — LOW (ref 34.5–45)
HGB BLD-MCNC: 10.6 G/DL — LOW (ref 11.5–15.5)
IANC: 7.51 K/UL — SIGNIFICANT CHANGE UP (ref 1.5–8.5)
IMM GRANULOCYTES NFR BLD AUTO: 0.4 % — SIGNIFICANT CHANGE UP (ref 0–1.5)
LYMPHOCYTES # BLD AUTO: 2.6 K/UL — SIGNIFICANT CHANGE UP (ref 1–3.3)
LYMPHOCYTES # BLD AUTO: 23.3 % — SIGNIFICANT CHANGE UP (ref 13–44)
MCHC RBC-ENTMCNC: 30.7 PG — SIGNIFICANT CHANGE UP (ref 27–34)
MCHC RBC-ENTMCNC: 31.4 GM/DL — LOW (ref 32–36)
MCV RBC AUTO: 98 FL — SIGNIFICANT CHANGE UP (ref 80–100)
MONOCYTES # BLD AUTO: 0.92 K/UL — HIGH (ref 0–0.9)
MONOCYTES NFR BLD AUTO: 8.3 % — SIGNIFICANT CHANGE UP (ref 2–14)
NEUTROPHILS # BLD AUTO: 7.51 K/UL — HIGH (ref 1.8–7.4)
NEUTROPHILS NFR BLD AUTO: 67.4 % — SIGNIFICANT CHANGE UP (ref 43–77)
NRBC # BLD: 0 /100 WBCS — SIGNIFICANT CHANGE UP
NRBC # FLD: 0 K/UL — SIGNIFICANT CHANGE UP
PLATELET # BLD AUTO: 212 K/UL — SIGNIFICANT CHANGE UP (ref 150–400)
RBC # BLD: 3.45 M/UL — LOW (ref 3.8–5.2)
RBC # FLD: 13.5 % — SIGNIFICANT CHANGE UP (ref 10.3–14.5)
RH IG SCN BLD-IMP: POSITIVE — SIGNIFICANT CHANGE UP
SARS-COV-2 RNA SPEC QL NAA+PROBE: SIGNIFICANT CHANGE UP
T PALLIDUM AB TITR SER: NEGATIVE — SIGNIFICANT CHANGE UP
WBC # BLD: 11.15 K/UL — HIGH (ref 3.8–10.5)
WBC # FLD AUTO: 11.15 K/UL — HIGH (ref 3.8–10.5)

## 2021-02-27 PROCEDURE — 99291 CRITICAL CARE FIRST HOUR: CPT

## 2021-02-27 RX ORDER — IBUPROFEN 200 MG
600 TABLET ORAL EVERY 6 HOURS
Refills: 0 | Status: COMPLETED | OUTPATIENT
Start: 2021-02-27 | End: 2022-01-26

## 2021-02-27 RX ORDER — OXYCODONE HYDROCHLORIDE 5 MG/1
5 TABLET ORAL ONCE
Refills: 0 | Status: DISCONTINUED | OUTPATIENT
Start: 2021-02-27 | End: 2021-03-01

## 2021-02-27 RX ORDER — IBUPROFEN 200 MG
600 TABLET ORAL EVERY 6 HOURS
Refills: 0 | Status: DISCONTINUED | OUTPATIENT
Start: 2021-02-27 | End: 2021-03-01

## 2021-02-27 RX ORDER — KETOROLAC TROMETHAMINE 30 MG/ML
30 SYRINGE (ML) INJECTION ONCE
Refills: 0 | Status: DISCONTINUED | OUTPATIENT
Start: 2021-02-27 | End: 2021-02-27

## 2021-02-27 RX ORDER — LANOLIN
1 OINTMENT (GRAM) TOPICAL EVERY 6 HOURS
Refills: 0 | Status: DISCONTINUED | OUTPATIENT
Start: 2021-02-27 | End: 2021-03-01

## 2021-02-27 RX ORDER — SODIUM CHLORIDE 9 MG/ML
1000 INJECTION, SOLUTION INTRAVENOUS
Refills: 0 | Status: DISCONTINUED | OUTPATIENT
Start: 2021-02-27 | End: 2021-02-27

## 2021-02-27 RX ORDER — TETANUS TOXOID, REDUCED DIPHTHERIA TOXOID AND ACELLULAR PERTUSSIS VACCINE, ADSORBED 5; 2.5; 8; 8; 2.5 [IU]/.5ML; [IU]/.5ML; UG/.5ML; UG/.5ML; UG/.5ML
0.5 SUSPENSION INTRAMUSCULAR ONCE
Refills: 0 | Status: DISCONTINUED | OUTPATIENT
Start: 2021-02-27 | End: 2021-03-01

## 2021-02-27 RX ORDER — SIMETHICONE 80 MG/1
80 TABLET, CHEWABLE ORAL EVERY 4 HOURS
Refills: 0 | Status: DISCONTINUED | OUTPATIENT
Start: 2021-02-27 | End: 2021-03-01

## 2021-02-27 RX ORDER — MAGNESIUM HYDROXIDE 400 MG/1
30 TABLET, CHEWABLE ORAL
Refills: 0 | Status: DISCONTINUED | OUTPATIENT
Start: 2021-02-27 | End: 2021-03-01

## 2021-02-27 RX ORDER — ACETAMINOPHEN 500 MG
975 TABLET ORAL
Refills: 0 | Status: DISCONTINUED | OUTPATIENT
Start: 2021-02-27 | End: 2021-03-01

## 2021-02-27 RX ORDER — CITRIC ACID/SODIUM CITRATE 300-500 MG
15 SOLUTION, ORAL ORAL EVERY 6 HOURS
Refills: 0 | Status: DISCONTINUED | OUTPATIENT
Start: 2021-02-27 | End: 2021-02-27

## 2021-02-27 RX ORDER — OXYTOCIN 10 UNIT/ML
333.33 VIAL (ML) INJECTION
Qty: 20 | Refills: 0 | Status: DISCONTINUED | OUTPATIENT
Start: 2021-02-27 | End: 2021-02-28

## 2021-02-27 RX ORDER — MORPHINE SULFATE 50 MG/1
4 CAPSULE, EXTENDED RELEASE ORAL ONCE
Refills: 0 | Status: DISCONTINUED | OUTPATIENT
Start: 2021-02-27 | End: 2021-02-27

## 2021-02-27 RX ORDER — BENZOCAINE 10 %
1 GEL (GRAM) MUCOUS MEMBRANE EVERY 6 HOURS
Refills: 0 | Status: DISCONTINUED | OUTPATIENT
Start: 2021-02-27 | End: 2021-03-01

## 2021-02-27 RX ORDER — OXYCODONE HYDROCHLORIDE 5 MG/1
5 TABLET ORAL
Refills: 0 | Status: DISCONTINUED | OUTPATIENT
Start: 2021-02-27 | End: 2021-03-01

## 2021-02-27 RX ORDER — DIPHENHYDRAMINE HCL 50 MG
25 CAPSULE ORAL EVERY 6 HOURS
Refills: 0 | Status: DISCONTINUED | OUTPATIENT
Start: 2021-02-27 | End: 2021-03-01

## 2021-02-27 RX ORDER — PRAMOXINE HYDROCHLORIDE 150 MG/15G
1 AEROSOL, FOAM RECTAL EVERY 4 HOURS
Refills: 0 | Status: DISCONTINUED | OUTPATIENT
Start: 2021-02-27 | End: 2021-03-01

## 2021-02-27 RX ORDER — HYDROCORTISONE 1 %
1 OINTMENT (GRAM) TOPICAL EVERY 6 HOURS
Refills: 0 | Status: DISCONTINUED | OUTPATIENT
Start: 2021-02-27 | End: 2021-03-01

## 2021-02-27 RX ORDER — SODIUM CHLORIDE 9 MG/ML
3 INJECTION INTRAMUSCULAR; INTRAVENOUS; SUBCUTANEOUS EVERY 8 HOURS
Refills: 0 | Status: DISCONTINUED | OUTPATIENT
Start: 2021-02-27 | End: 2021-03-01

## 2021-02-27 RX ORDER — AER TRAVELER 0.5 G/1
1 SOLUTION RECTAL; TOPICAL EVERY 4 HOURS
Refills: 0 | Status: DISCONTINUED | OUTPATIENT
Start: 2021-02-27 | End: 2021-03-01

## 2021-02-27 RX ORDER — DIBUCAINE 1 %
1 OINTMENT (GRAM) RECTAL EVERY 6 HOURS
Refills: 0 | Status: DISCONTINUED | OUTPATIENT
Start: 2021-02-27 | End: 2021-03-01

## 2021-02-27 RX ORDER — INFLUENZA VIRUS VACCINE 15; 15; 15; 15 UG/.5ML; UG/.5ML; UG/.5ML; UG/.5ML
0.5 SUSPENSION INTRAMUSCULAR ONCE
Refills: 0 | Status: DISCONTINUED | OUTPATIENT
Start: 2021-02-27 | End: 2021-03-01

## 2021-02-27 RX ADMIN — Medication 1000 MILLIUNIT(S)/MIN: at 05:56

## 2021-02-27 RX ADMIN — Medication 600 MILLIGRAM(S): at 22:26

## 2021-02-27 RX ADMIN — Medication 30 MILLIGRAM(S): at 04:20

## 2021-02-27 RX ADMIN — MORPHINE SULFATE 4 MILLIGRAM(S): 50 CAPSULE, EXTENDED RELEASE ORAL at 05:00

## 2021-02-27 RX ADMIN — OXYCODONE HYDROCHLORIDE 5 MILLIGRAM(S): 5 TABLET ORAL at 09:00

## 2021-02-27 NOTE — OB RN DELIVERY SUMMARY - BABY A: DATE/TIME OF DELIVERY
Chart reviewed. Last Colonoscopy  on 8.9.17 with Dr. ANDRÉS Owen. Recommend repeat  3 years.     Please call pt to schedule Colonoscopy  with Dr. ANDRÉS Owen.      Schedule Procedure:   Please Schedule Routine (next available or patient preference)  Procedure: Colonoscopy (09912) with MD preference for bowel prep.    Diagnosis: Colon Adenomas D12.6  Is patient:    Diabetic? Yes: Diet Controlled   ANTIPLATELET / ANTICOAGULATION: MEDICATION:  None  Latex allergy: No  Sleep apnea: Yes (uses CPAP)  Location: Patient Preference  Special Instructions: Hx of PONV (postoperative nausea and vomiting)  MAC Anesthesia (previously used)     27-Feb-2021 03:54

## 2021-02-27 NOTE — ED PROVIDER NOTE - CLINICAL SUMMARY MEDICAL DECISION MAKING FREE TEXT BOX
See HPI for full ED course.    Critical care billing:  Upon my evaluation, this patient had a high probability of imminent or life-threatening deterioration due to precipitous vaginal delivery, which required my direct attention, intervention, and personal management.  The patient has a  medical condition that impairs one or more vital organ systems.  Frequent personal assessment and adjustment of medical interventions was performed.      I have personally provided 30 minutes of critical care time exclusive of time spent on separately billable procedures. Time includes review of laboratory data, radiology results, discussion with consultants, patient and family; monitoring for potential decompensation, as well as time spent retrieving data and reviewing the chart and documenting the visit. Interventions were performed as documented above.

## 2021-02-27 NOTE — OB PROVIDER DELIVERY SUMMARY - NSPROVIDERDELIVERYNOTE_OBGYN_ALL_OB_FT
Spontaneous vaginal delivery of liveborn in the [facility] emergency department by the EM providers. When OB team arrived to the ED, infant was delivered and crying, being suctioned, cord clamped. Cord cut by OB team. Infant handed to pediatricians. Patient transferred to labor and delivery. Placenta delivered intact with a 3 vessel cord. Fundal massage was given and uterine fundus was found to be firm. Vaginal exam revealed an intact cervix, vaginal walls and sulci. Patient had a 2nd degree laceration in the perineum that was repaired with 2.0 chromic suture after lidocaine was was used for local anesthesia. Excellent hemostasis was noted. Cytotec was placed rectally. Patient was stable and went to recovery. Count was correct x 2.

## 2021-02-27 NOTE — OB PROVIDER H&P - HISTORY OF PRESENT ILLNESS
36yo  @38w1d presented to the ED in labor,  in the ED bay.   +FM. -VB. -LOF. -Ctx.  EFW: 1458    PNc: c/b gDMA1    OBHx:   -  x4 (, 5#6, 35wks; 2015, 6#6; 2017, 6#7; 2019, 6#1, 36wks)  - SAb x2  - MAb x1, s/p D&C  GynHx: denies hx of fibroids/cysts/abnml pap smears/STds)  PMHx: denies  SHx: D&C x1  Meds: PNVs  All: NKDA

## 2021-02-27 NOTE — ED PROVIDER NOTE - OBJECTIVE STATEMENT
Attending MD Olivas.  Pt is a 34 yo female with unknown pmhx but reported  at term with contractions 1 minute apart.  Pt arrives by EMS with .  In ambay pt endorsing urge to push.  I obtained sterile gloves to assess pt and accompany her to L & D however on digital exam pt noted to be  and delivering full term infant.  Finger sweep for cord and infant delivered.  Infant pink, immediate cry.  Joppa obtained and infant wrapped in blanket.  Pt and infant, still attached to umbilical cord moved into Treatment room A while Ob and  called for precipitous delivery.  Pt endorsing pregnancy complicated by gestational diabetes only.  Infant bulb suctioned and crying, moving all extremities.  Cord clamped, OB and  arrived and took over care of mother and infant.  Plan to transport to L & D for placenta delivery.  Cord cut and infant moved to  warmer and care assumed by  team.

## 2021-02-27 NOTE — DISCHARGE NOTE OB - CARE PLAN
Principal Discharge DX:	Vaginal delivery  Goal:	Routine postpartum care  Assessment and plan of treatment:	follow up in 6 weeks /regular diet & activity as tolerate   Principal Discharge DX:	Vaginal delivery  Goal:	Routine postpartum care  Assessment and plan of treatment:	follow up in 4 weeks /regular diet & activity as tolerate

## 2021-02-27 NOTE — OB PROVIDER H&P - ASSESSMENT
36yo  @38w1d presented to the ED in labor,  in the ED bay. Patient transferred to labor and delivery for delivery of the placenta and repair of laceration.  - admit to L&D  - routine labs  - COVID pcr  - Consent signed via verbal consent  dyan  - IVF  - EFM, [place]    D/W Dr. Ceci Bonilla, PGY-1

## 2021-02-27 NOTE — OB RN DELIVERY SUMMARY - NS_SEPSISRSKCALC_OBGYN_ALL_OB_FT
Radha
No temperature has been documented for this patient in CPN or on the OB Flowsheet. Ensure the highest temperature during labor was documented on the OB Flowsheet.  GBS status in the 'Prenatal Lab tests/results section' on the OB RN Patient Profile must be documented.

## 2021-02-27 NOTE — DISCHARGE NOTE OB - PLAN OF CARE
follow up in 6 weeks /regular diet & activity as tolerate Routine postpartum care follow up in 4 weeks /regular diet & activity as tolerate

## 2021-02-27 NOTE — OB NEONATOLOGY/PEDIATRICIAN DELIVERY SUMMARY - NSPEDSNEONOTESA_OBGYN_ALL_OB_FT
38.1 wk infant born to a 35 y.o. , B+/GBS negative (), all other labs unremarkable (per labor and delivery RN- received hard copies after delivery). OBHx: SAB x3 (,  and  -with d&c),  (2014-35 wk, ,  and  -36wk). This pregnancy complicated by GDM -diet controlled. SROM at 0345 with clear fluid, infant delivered in the ER (triage area). Code 100 called, infant was already born upon Peds arrival, infant was pink with good tone, W/D/S/S, Apgars 9/9. Infant sent to well baby nursery. Dr. Vera present at code 100.

## 2021-02-27 NOTE — DISCHARGE NOTE OB - PATIENT PORTAL LINK FT
You can access the FollowMyHealth Patient Portal offered by [facility] by registering at the following website: [email or web address]. By joining "[employer]’s FollowMyHealth portal, you will also be able to view your health information using other applications (apps) compatible with our system.

## 2021-02-27 NOTE — ED ADULT TRIAGE NOTE - CHIEF COMPLAINT QUOTE
pt arrives w/ c/o contraction 1 min apart. pt states this the fifth pregnancy and the due date is 3/12/2021. pt states her water broke and felt like she needed to bear down.  code 100 and Code OB. pt taken straight to [facility].

## 2021-02-27 NOTE — DISCHARGE NOTE OB - CARE PROVIDER_API CALL
Brijesh Marques)  Obstetrics and Gynecology  [address] [place] [address]  Phone: [phone]  Fax: [phone]  Follow Up Time:    Nereida Paris)  Obstetrics and Gynecology  [address] [place] [address]  Phone: [phone]  Fax: [phone]  Follow Up Time:

## 2021-02-27 NOTE — OB RN DELIVERY SUMMARY - BABYS CARE PROVIDER NAME, OB PROFILE
Ivan normal... Well appearing, well nourished, awake, alert, oriented to person, place, time/situation and in no apparent distress.

## 2021-02-28 LAB
SARS-COV-2 IGG SERPL QL IA: NEGATIVE — SIGNIFICANT CHANGE UP
SARS-COV-2 IGM SERPL IA-ACNC: 0.13 INDEX — SIGNIFICANT CHANGE UP

## 2021-02-28 RX ORDER — ACETAMINOPHEN 500 MG
3 TABLET ORAL
Qty: 0 | Refills: 0 | DISCHARGE
Start: 2021-02-28

## 2021-02-28 RX ORDER — ASPIRIN/CALCIUM CARB/MAGNESIUM 324 MG
0 TABLET ORAL
Qty: 0 | Refills: 0 | DISCHARGE

## 2021-02-28 RX ORDER — IBUPROFEN 200 MG
1 TABLET ORAL
Qty: 0 | Refills: 0 | DISCHARGE
Start: 2021-02-28

## 2021-02-28 RX ORDER — SENNA PLUS 8.6 MG/1
1 TABLET ORAL DAILY
Refills: 0 | Status: DISCONTINUED | OUTPATIENT
Start: 2021-02-28 | End: 2021-03-01

## 2021-02-28 RX ADMIN — Medication 1 TABLET(S): at 11:40

## 2021-02-28 RX ADMIN — OXYCODONE HYDROCHLORIDE 5 MILLIGRAM(S): 5 TABLET ORAL at 06:43

## 2021-02-28 RX ADMIN — Medication 600 MILLIGRAM(S): at 04:22

## 2021-02-28 RX ADMIN — Medication 975 MILLIGRAM(S): at 13:52

## 2021-02-28 RX ADMIN — Medication 600 MILLIGRAM(S): at 11:40

## 2021-02-28 RX ADMIN — Medication 600 MILLIGRAM(S): at 16:47

## 2021-02-28 RX ADMIN — MAGNESIUM HYDROXIDE 30 MILLILITER(S): 400 TABLET, CHEWABLE ORAL at 04:29

## 2021-02-28 RX ADMIN — SENNA PLUS 1 TABLET(S): 8.6 TABLET ORAL at 06:43

## 2021-02-28 NOTE — PROGRESS NOTE ADULT - SUBJECTIVE AND OBJECTIVE BOX
Post partum Day #1      Pt without complaints  vital signs stable      Abdomen soft  fundus firm, non tender  extremities non tender    lochia wnl      Patient doing well  Routine post partum care  Patient requesting dischare today

## 2021-03-01 VITALS
TEMPERATURE: 99 F | RESPIRATION RATE: 17 BRPM | HEART RATE: 80 BPM | OXYGEN SATURATION: 100 % | DIASTOLIC BLOOD PRESSURE: 55 MMHG | SYSTOLIC BLOOD PRESSURE: 122 MMHG

## 2021-03-01 RX ADMIN — SENNA PLUS 1 TABLET(S): 8.6 TABLET ORAL at 00:33

## 2021-03-01 RX ADMIN — Medication 600 MILLIGRAM(S): at 05:06

## 2021-03-01 RX ADMIN — Medication 1 TABLET(S): at 12:22

## 2021-03-01 RX ADMIN — Medication 600 MILLIGRAM(S): at 18:00

## 2021-03-01 RX ADMIN — Medication 600 MILLIGRAM(S): at 12:25

## 2021-03-01 NOTE — PROGRESS NOTE ADULT - SUBJECTIVE AND OBJECTIVE BOX
S: Patient doing well. Minimal lochia. Pain controlled.    O: Vital Signs Last 24 Hrs  T(C): 37.2 (01 Mar 2021 05:48), Max: 37.2 (01 Mar 2021 05:48)  T(F): 99 (01 Mar 2021 05:48), Max: 99 (01 Mar 2021 05:48)  HR: 72 (01 Mar 2021 05:48) (72 - 91)  BP: 100/60 (01 Mar 2021 05:48) (100/60 - 109/63)  BP(mean): 62 (01 Mar 2021 05:48) (62 - 62)  RR: 17 (01 Mar 2021 05:48) (17 - 17)  SpO2: 100% (01 Mar 2021 05:48) (97% - 100%)    Gen: NAD  Abd: soft, NT, ND, fundus firm below umbilicus  Lochia: moderate  Ext: no tenderness    Labs:  Bpos    A: 35y PPD#2 s/p  doing well.    Plan:  continue care  discharge instructions given

## 2021-03-03 ENCOUNTER — APPOINTMENT (OUTPATIENT)
Dept: MATERNAL FETAL MEDICINE | Facility: CLINIC | Age: 36
End: 2021-03-03

## 2021-07-08 ENCOUNTER — RESULT REVIEW (OUTPATIENT)
Age: 36
End: 2021-07-08

## 2021-09-14 NOTE — OB RN PATIENT PROFILE - PRO MENTAL HEALTH SX RECENT
Requesting medication refill.  Please approve or deny this request.    Rx requested:  Requested Prescriptions     Pending Prescriptions Disp Refills    torsemide (DEMADEX) 20 MG tablet [Pharmacy Med Name: TORSEMIDE TABS 20MG] 90 tablet 3     Sig: TAKE 1 TABLET DAILY       Last Office Visit:   4/30/2021    Last Filled:      Last Labs:      Next Visit Date:  Future Appointments   Date Time Provider Qasim Rae   10/5/2021  1:00 PM Damir Ortiz MD [facility] [place] [address]   11/30/2021  2:15 PM Lemuel Antoine MD [facility] [place]   6/27/2022 12:45 PM Carly Grantr, MD [address] [place] [address]   6/29/2022  2:00 PM Nishi Damian MD [facility]   7/5/2022  2:00 PM Elif Lopez DO [facility] EMERGENCY [facility] [place] none

## 2022-01-13 NOTE — DISCHARGE NOTE OB - NS OB DC PAIN RELIEF
English Tylenol for minor pain as directed/If you have episitomy or tear repair, use anesthetic spray or cream as directed by your healthcare provider for relief of discomfort

## 2022-03-29 ENCOUNTER — ASOB RESULT (OUTPATIENT)
Age: 37
End: 2022-03-29

## 2022-03-29 ENCOUNTER — APPOINTMENT (OUTPATIENT)
Dept: ANTEPARTUM | Facility: CLINIC | Age: 37
End: 2022-03-29
Payer: MEDICAID

## 2022-03-29 ENCOUNTER — APPOINTMENT (OUTPATIENT)
Dept: ANTEPARTUM | Facility: CLINIC | Age: 37
End: 2022-03-29

## 2022-03-29 PROCEDURE — 76811 OB US DETAILED SNGL FETUS: CPT

## 2022-03-29 PROCEDURE — 76817 TRANSVAGINAL US OBSTETRIC: CPT

## 2022-04-26 NOTE — OB RN DELIVERY SUMMARY - NS_MATERNALMORBID_OBGYN_ALL_OB
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 -?mixed mechanism
-cannot tolerate timolol Plan: Patient feeling tired with Timolol VF OS is worse - inferior arcuate defect Continue Latanoprost QHS OU. Discontinue Timolol Start Dorzolamide BID OS only RTC as scheduled with Dr Delvis Ybarra for consult None

## 2022-05-12 ENCOUNTER — APPOINTMENT (OUTPATIENT)
Dept: MATERNAL FETAL MEDICINE | Facility: CLINIC | Age: 37
End: 2022-05-12
Payer: MEDICAID

## 2022-05-12 ENCOUNTER — ASOB RESULT (OUTPATIENT)
Age: 37
End: 2022-05-12

## 2022-05-12 ENCOUNTER — APPOINTMENT (OUTPATIENT)
Dept: PEDIATRIC CARDIOLOGY | Facility: CLINIC | Age: 37
End: 2022-05-12
Payer: MEDICAID

## 2022-05-12 PROCEDURE — G0108 DIAB MANAGE TRN  PER INDIV: CPT | Mod: 95

## 2022-05-12 PROCEDURE — 93325 DOPPLER ECHO COLOR FLOW MAPG: CPT

## 2022-05-12 PROCEDURE — 99203 OFFICE O/P NEW LOW 30 MIN: CPT

## 2022-05-12 PROCEDURE — 76825 ECHO EXAM OF FETAL HEART: CPT

## 2022-05-12 PROCEDURE — 76827 ECHO EXAM OF FETAL HEART: CPT

## 2022-05-12 RX ORDER — BLOOD-GLUCOSE METER
W/DEVICE KIT MISCELLANEOUS
Qty: 1 | Refills: 0 | Status: ACTIVE | COMMUNITY
Start: 2022-05-12 | End: 1900-01-01

## 2022-05-12 RX ORDER — LANCETS 33 GAUGE
EACH MISCELLANEOUS
Qty: 6 | Refills: 1 | Status: ACTIVE | COMMUNITY
Start: 2022-05-12 | End: 1900-01-01

## 2022-05-12 RX ORDER — BLOOD-GLUCOSE METER
KIT MISCELLANEOUS
Qty: 6 | Refills: 2 | Status: ACTIVE | COMMUNITY
Start: 2022-05-12 | End: 1900-01-01

## 2022-05-19 ENCOUNTER — APPOINTMENT (OUTPATIENT)
Dept: MATERNAL FETAL MEDICINE | Facility: CLINIC | Age: 37
End: 2022-05-19
Payer: MEDICAID

## 2022-05-19 ENCOUNTER — ASOB RESULT (OUTPATIENT)
Age: 37
End: 2022-05-19

## 2022-05-19 PROCEDURE — G0108 DIAB MANAGE TRN  PER INDIV: CPT | Mod: 95

## 2022-06-02 ENCOUNTER — APPOINTMENT (OUTPATIENT)
Dept: MATERNAL FETAL MEDICINE | Facility: CLINIC | Age: 37
End: 2022-06-02
Payer: MEDICAID

## 2022-06-02 ENCOUNTER — ASOB RESULT (OUTPATIENT)
Age: 37
End: 2022-06-02

## 2022-06-02 PROCEDURE — G0108 DIAB MANAGE TRN  PER INDIV: CPT | Mod: 95

## 2022-06-14 ENCOUNTER — ASOB RESULT (OUTPATIENT)
Age: 37
End: 2022-06-14

## 2022-06-14 ENCOUNTER — APPOINTMENT (OUTPATIENT)
Dept: ANTEPARTUM | Facility: CLINIC | Age: 37
End: 2022-06-14
Payer: MEDICAID

## 2022-06-14 ENCOUNTER — APPOINTMENT (OUTPATIENT)
Dept: MATERNAL FETAL MEDICINE | Facility: CLINIC | Age: 37
End: 2022-06-14

## 2022-06-14 PROCEDURE — 76816 OB US FOLLOW-UP PER FETUS: CPT

## 2022-06-30 ENCOUNTER — OUTPATIENT (OUTPATIENT)
Dept: INPATIENT UNIT | Facility: HOSPITAL | Age: 37
LOS: 1 days | Discharge: HOME | End: 2022-06-30

## 2022-06-30 VITALS — HEART RATE: 71 BPM | SYSTOLIC BLOOD PRESSURE: 112 MMHG | DIASTOLIC BLOOD PRESSURE: 59 MMHG

## 2022-06-30 VITALS
HEART RATE: 93 BPM | TEMPERATURE: 98 F | DIASTOLIC BLOOD PRESSURE: 58 MMHG | RESPIRATION RATE: 16 BRPM | SYSTOLIC BLOOD PRESSURE: 112 MMHG

## 2022-06-30 DIAGNOSIS — O26.899 OTHER SPECIFIED PREGNANCY RELATED CONDITIONS, UNSPECIFIED TRIMESTER: ICD-10-CM

## 2022-06-30 DIAGNOSIS — Z98.890 OTHER SPECIFIED POSTPROCEDURAL STATES: Chronic | ICD-10-CM

## 2022-06-30 DIAGNOSIS — Z3A.00 WEEKS OF GESTATION OF PREGNANCY NOT SPECIFIED: ICD-10-CM

## 2022-06-30 LAB
APPEARANCE UR: ABNORMAL
BACTERIA # UR AUTO: ABNORMAL
BILIRUB UR-MCNC: NEGATIVE — SIGNIFICANT CHANGE UP
COLOR SPEC: COLORLESS — SIGNIFICANT CHANGE UP
DIFF PNL FLD: NEGATIVE — SIGNIFICANT CHANGE UP
EPI CELLS # UR: 5 /HPF — SIGNIFICANT CHANGE UP (ref 0–5)
GLUCOSE UR QL: ABNORMAL
HYALINE CASTS # UR AUTO: 1 /LPF — SIGNIFICANT CHANGE UP (ref 0–7)
KETONES UR-MCNC: ABNORMAL
LEUKOCYTE ESTERASE UR-ACNC: ABNORMAL
NITRITE UR-MCNC: NEGATIVE — SIGNIFICANT CHANGE UP
PH UR: 6.5 — SIGNIFICANT CHANGE UP (ref 5–8)
PROT UR-MCNC: NEGATIVE — SIGNIFICANT CHANGE UP
RBC CASTS # UR COMP ASSIST: 2 /HPF — SIGNIFICANT CHANGE UP (ref 0–4)
SP GR SPEC: 1.01 — SIGNIFICANT CHANGE UP (ref 1–1.05)
UROBILINOGEN FLD QL: SIGNIFICANT CHANGE UP
WBC UR QL: 21 /HPF — HIGH (ref 0–5)

## 2022-06-30 PROCEDURE — 76830 TRANSVAGINAL US NON-OB: CPT | Mod: 26

## 2022-06-30 PROCEDURE — 99214 OFFICE O/P EST MOD 30 MIN: CPT

## 2022-06-30 PROCEDURE — 76818 FETAL BIOPHYS PROFILE W/NST: CPT | Mod: 26

## 2022-06-30 NOTE — OB RN TRIAGE NOTE - CHIEF COMPLAINT QUOTE
sent in by  to evaluate hx  delivery.    VE 1-2 cm today sent in by  to evaluate hx  delivery.   pressure?    VE 1-2 cm today

## 2022-06-30 NOTE — OB PROVIDER TRIAGE NOTE - CURRENT PREGNANCY COMPLICATIONS, OB PROFILE
gdm-diet/Gestational Diabetes/Gestational Age less than 36 Weeks GDMA1/Gestational Diabetes/Gestational Age less than 36 Weeks

## 2022-06-30 NOTE — OB PROVIDER TRIAGE NOTE - NSHPPHYSICALEXAM_GEN_ALL_CORE
ICU Vital Signs Last 24 Hrs  T(C): 36.7 (30 Jun 2022 19:24), Max: 36.7 (30 Jun 2022 16:54)  T(F): 98.06 (30 Jun 2022 19:24), Max: 98.1 (30 Jun 2022 16:54)  HR: 71 (30 Jun 2022 19:30) (71 - 93)  BP: 112/59 (30 Jun 2022 19:30) (112/58 - 112/59)  BP(mean): --  ABP: --  ABP(mean): --  RR: 16 (30 Jun 2022 16:54) (16 - 16)  SpO2: --    Abdomen soft nontender  TAS: Cephalic presentation, posterior placenta, ROWDY: 9.43, EFW: 1831gms, BPP: 8/8   Speculum: Unable to visualize cervix due to vaginal walls  TVS: CL: 3.35-3.65 no funneling or dynamic changes   SVE: External os 2.5cm dilated but internal os closed   FHR: 145bpm, moderate variability, accels, no decels   TOCO: Two contraction in two hours

## 2022-06-30 NOTE — OB PROVIDER TRIAGE NOTE - NSICDXPASTMEDICALHX_GEN_ALL_CORE_FT
PAST MEDICAL HISTORY:  Diet controlled gestational diabetes mellitus (GDM) in second trimester     Miscarriage 2014 with d&c    Vaginal delivery   (35wks)        2019  (36wks)

## 2022-06-30 NOTE — OB PROVIDER TRIAGE NOTE - NS_OBGYNHISTORY_OBGYN_ALL_OB_FT
X5  7/10/2014 PT D @36wks F 5lbs  2015 FT F 7lbs GDMA1  2017 FT  M GDMA1 7lbs   2019 PTD @36wks M 6lbs GDMA1  2021  FT F 6lbs GDMA1  [facility] D&C X1

## 2022-06-30 NOTE — OB PROVIDER TRIAGE NOTE - NSOBPROVIDERNOTE_OBGYN_ALL_OB_FT
No evidence of  labor at this time. Discussed findings with Dr. Marques. Pt. d/c'd home. Pt. to follow up with OB tomorrow. Pt. instructed to return to triage with increase abdominal contractions, leakage of fluid, vaginal bleeding or decrease fetal movement. Increase PO hydration encouraged and to use belly band for pelvic support. No evidence of  labor at this time. Discussed findings with Dr. Marques. No need for MFM consult at this time regarding betamethasone. Pt. d/c'd home. Pt. to follow up with OB tomorrow. Pt. instructed to return to triage with increase abdominal contractions, leakage of fluid, vaginal bleeding or decrease fetal movement. Increase PO hydration encouraged and to use belly band for pelvic support.

## 2022-06-30 NOTE — OB RN TRIAGE NOTE - FALL HARM RISK - UNIVERSAL INTERVENTIONS
Bed in lowest position, wheels locked, appropriate side rails in place/Call bell, personal items and telephone in reach/Instruct patient to call for assistance before getting out of bed or chair/Non-slip footwear when patient is out of bed/[place] to call system/Physically safe environment - no spills, clutter or unnecessary equipment/Purposeful Proactive Rounding/Room/bathroom lighting operational, light cord in reach

## 2022-06-30 NOTE — OB RN TRIAGE NOTE - NSICDXPASTMEDICALHX_GEN_ALL_CORE_FT
PAST MEDICAL HISTORY:  Diet controlled gestational diabetes mellitus (GDM) in second trimester     Miscarriage 2014 with d&c    Vaginal delivery   (35wks)     2017   2019  (36wks)     PAST MEDICAL HISTORY:  Diet controlled gestational diabetes mellitus (GDM) in second trimester     Miscarriage 2014 with d&c    Vaginal delivery   (35wks)        2019  (36wks)

## 2022-06-30 NOTE — OB PROVIDER TRIAGE NOTE - HISTORY OF PRESENT ILLNESS
Pt. is a 36y/o  EGA 32.6wks was sent in from the office for possible  labor. Pt. states she was at her routine visit and told her OB she was feeling increased pelvic pressure intermittently. Pt. states she was examined and was found to be 1.5cm dilated. Pt. denies abdominal contractions      7/10/2014 PT D @36wks F 5lbs  2015 FT F 7lbs GDMA1  2017 FT  M GDMA1 7lbs   2019 PTD @36wks M 6lbs GDMA1  2021  FT F 6lbs GDMA1  [facility] D&C X1 Pt. is a 36y/o  EGA 32.6wks was sent in from the office for possible  labor. Pt. states she was at her routine visit and told her OB she was feeling increased pelvic pressure intermittently. Pt. states she was examined and was found to be 1.5cm dilated. Pt. states Dr. Paris would like an [facility] consult regarding betamethasone at this time. Pt. denies abdominal contractions, leakage of fluid, and vaginal bleeding. Pt. reports good fetal movement.     AP: GDMA1  Medical Hx: Denies  Surgical Hx: D&C   OBGYN Hx:    7/10/2014 @36wks F 5#0   2015 FT F 7#0 GDMA1   2017 FT GDMA1 7#0   2019 @36wks 6#0 GDMA1   2021 6#0 GDMA1  SABx3 D&C X1  and     Meds: ASA and PNV  NKDA

## 2022-06-30 NOTE — OB PROVIDER TRIAGE NOTE - ADDITIONAL INSTRUCTIONS
Pt. to follow up with OB tomorrow. Pt. instructed to return to triage with increase abdominal contractions, leakage of fluid, vaginal bleeding or decrease fetal movement. Increase PO hydration encouraged and to use belly band for pelvic support.

## 2022-06-30 NOTE — OB RN TRIAGE NOTE - NS_OBGYNHISTORY_OBGYN_ALL_OB_FT
X3 full term   X2    all gdm- diet    X3 SaB D&C X1  X5  7/10/2014 PT D @36wks F 5lbs  2015 FT F 7lbs GDMA1  2017 FT  M GDMA1 7lbs   2019 PTD @36wks M 6lbs GDMA1  2021  FT F 6lbs GDMA1  [facility] D&C X1

## 2022-07-05 ENCOUNTER — ASOB RESULT (OUTPATIENT)
Age: 37
End: 2022-07-05

## 2022-07-05 ENCOUNTER — APPOINTMENT (OUTPATIENT)
Dept: MATERNAL FETAL MEDICINE | Facility: CLINIC | Age: 37
End: 2022-07-05

## 2022-07-05 ENCOUNTER — APPOINTMENT (OUTPATIENT)
Dept: ANTEPARTUM | Facility: CLINIC | Age: 37
End: 2022-07-05

## 2022-07-05 PROCEDURE — 76819 FETAL BIOPHYS PROFIL W/O NST: CPT

## 2022-07-05 PROCEDURE — 76816 OB US FOLLOW-UP PER FETUS: CPT

## 2022-08-02 ENCOUNTER — ASOB RESULT (OUTPATIENT)
Age: 37
End: 2022-08-02

## 2022-08-02 ENCOUNTER — APPOINTMENT (OUTPATIENT)
Dept: ANTEPARTUM | Facility: CLINIC | Age: 37
End: 2022-08-02

## 2022-08-02 PROCEDURE — 76819 FETAL BIOPHYS PROFIL W/O NST: CPT

## 2022-08-02 PROCEDURE — 76816 OB US FOLLOW-UP PER FETUS: CPT

## 2022-08-03 ENCOUNTER — ASOB RESULT (OUTPATIENT)
Age: 37
End: 2022-08-03

## 2022-08-03 ENCOUNTER — APPOINTMENT (OUTPATIENT)
Dept: MATERNAL FETAL MEDICINE | Facility: CLINIC | Age: 37
End: 2022-08-03

## 2022-08-03 PROCEDURE — G0108 DIAB MANAGE TRN  PER INDIV: CPT | Mod: 95

## 2022-08-13 ENCOUNTER — OUTPATIENT (OUTPATIENT)
Dept: INPATIENT UNIT | Facility: HOSPITAL | Age: 37
LOS: 1 days | End: 2022-08-13

## 2022-08-13 VITALS
TEMPERATURE: 99 F | DIASTOLIC BLOOD PRESSURE: 58 MMHG | RESPIRATION RATE: 16 BRPM | SYSTOLIC BLOOD PRESSURE: 108 MMHG | HEART RATE: 83 BPM

## 2022-08-13 DIAGNOSIS — O26.899 OTHER SPECIFIED PREGNANCY RELATED CONDITIONS, UNSPECIFIED TRIMESTER: ICD-10-CM

## 2022-08-13 DIAGNOSIS — Z3A.00 WEEKS OF GESTATION OF PREGNANCY NOT SPECIFIED: ICD-10-CM

## 2022-08-13 DIAGNOSIS — Z98.890 OTHER SPECIFIED POSTPROCEDURAL STATES: Chronic | ICD-10-CM

## 2022-08-13 NOTE — OB RN TRIAGE NOTE - FALL HARM RISK - UNIVERSAL INTERVENTIONS
Bed in lowest position, wheels locked, appropriate side rails in place/Call bell, personal items and telephone in reach/Instruct patient to call for assistance before getting out of bed or chair/Non-slip footwear when patient is out of bed/[place] to call system/Physically safe environment - no spills, clutter or unnecessary equipment/Purposeful Proactive Rounding/Room/bathroom lighting operational, light cord in reach

## 2022-08-13 NOTE — OB RN TRIAGE NOTE - NS_OBGYNHISTORY_OBGYN_ALL_OB_FT
X5  7/10/2014 PT D @36wks F 5lbs  2015 FT F 7lbs GDMA1  2017 FT  M GDMA1 7lbs   2019 PTD @36wks M 6lbs GDMA1  2021  FT F 6lbs GDMA1  [facility] D&C X1

## 2022-08-13 NOTE — OB RN TRIAGE NOTE - NSICDXPASTMEDICALHX_GEN_ALL_CORE_FT
PAST MEDICAL HISTORY:  No pertinent past medical history PAST MEDICAL HISTORY:  2019 novel coronavirus disease (COVID-19) 4/2020

## 2022-08-14 ENCOUNTER — INPATIENT (INPATIENT)
Facility: HOSPITAL | Age: 37
LOS: 0 days | Discharge: HOME | End: 2022-08-15
Attending: OBSTETRICS & GYNECOLOGY | Admitting: OBSTETRICS & GYNECOLOGY
Payer: MEDICAID

## 2022-08-14 VITALS — SYSTOLIC BLOOD PRESSURE: 106 MMHG | DIASTOLIC BLOOD PRESSURE: 59 MMHG | HEART RATE: 75 BPM

## 2022-08-14 VITALS
RESPIRATION RATE: 16 BRPM | SYSTOLIC BLOOD PRESSURE: 130 MMHG | OXYGEN SATURATION: 98 % | TEMPERATURE: 98 F | HEART RATE: 68 BPM | DIASTOLIC BLOOD PRESSURE: 68 MMHG

## 2022-08-14 DIAGNOSIS — O26.899 OTHER SPECIFIED PREGNANCY RELATED CONDITIONS, UNSPECIFIED TRIMESTER: ICD-10-CM

## 2022-08-14 DIAGNOSIS — Z98.890 OTHER SPECIFIED POSTPROCEDURAL STATES: Chronic | ICD-10-CM

## 2022-08-14 DIAGNOSIS — Z3A.00 WEEKS OF GESTATION OF PREGNANCY NOT SPECIFIED: ICD-10-CM

## 2022-08-14 DIAGNOSIS — Z04.9 ENCOUNTER FOR EXAMINATION AND OBSERVATION FOR UNSPECIFIED REASON: ICD-10-CM

## 2022-08-14 LAB
APTT BLD: 22.7 SEC — LOW (ref 27.5–35.5)
BLD GP AB SCN SERPL QL: SIGNIFICANT CHANGE UP
HBV SURFACE AG SERPL QL IA: SIGNIFICANT CHANGE UP
HCT VFR BLD CALC: 33 % — LOW (ref 34.5–45)
HGB BLD-MCNC: 10.7 G/DL — LOW (ref 11.5–15.5)
HIV 1+2 AB+HIV1 P24 AG SERPL QL IA: SIGNIFICANT CHANGE UP
INR BLD: 0.88 RATIO — SIGNIFICANT CHANGE UP (ref 0.88–1.16)
MCHC RBC-ENTMCNC: 28.5 PG — SIGNIFICANT CHANGE UP (ref 27–34)
MCHC RBC-ENTMCNC: 32.4 GM/DL — SIGNIFICANT CHANGE UP (ref 32–36)
MCV RBC AUTO: 87.8 FL — SIGNIFICANT CHANGE UP (ref 80–100)
NRBC # BLD: 0 /100 WBCS — SIGNIFICANT CHANGE UP (ref 0–0)
PLATELET # BLD AUTO: 213 K/UL — SIGNIFICANT CHANGE UP (ref 150–400)
PROTHROM AB SERPL-ACNC: 10.5 SEC — SIGNIFICANT CHANGE UP (ref 10.5–13.4)
RBC # BLD: 3.76 M/UL — LOW (ref 3.8–5.2)
RBC # FLD: 13.9 % — SIGNIFICANT CHANGE UP (ref 10.3–14.5)
SARS-COV-2 RNA SPEC QL NAA+PROBE: SIGNIFICANT CHANGE UP
WBC # BLD: 10.47 K/UL — SIGNIFICANT CHANGE UP (ref 3.8–10.5)
WBC # FLD AUTO: 10.47 K/UL — SIGNIFICANT CHANGE UP (ref 3.8–10.5)

## 2022-08-14 RX ORDER — SIMETHICONE 80 MG/1
80 TABLET, CHEWABLE ORAL EVERY 4 HOURS
Refills: 0 | Status: DISCONTINUED | OUTPATIENT
Start: 2022-08-14 | End: 2022-08-15

## 2022-08-14 RX ORDER — AER TRAVELER 0.5 G/1
1 SOLUTION RECTAL; TOPICAL EVERY 4 HOURS
Refills: 0 | Status: DISCONTINUED | OUTPATIENT
Start: 2022-08-14 | End: 2022-08-15

## 2022-08-14 RX ORDER — ACETAMINOPHEN 500 MG
975 TABLET ORAL EVERY 6 HOURS
Refills: 0 | Status: DISCONTINUED | OUTPATIENT
Start: 2022-08-14 | End: 2022-08-15

## 2022-08-14 RX ORDER — BENZOCAINE 10 %
1 GEL (GRAM) MUCOUS MEMBRANE EVERY 6 HOURS
Refills: 0 | Status: DISCONTINUED | OUTPATIENT
Start: 2022-08-14 | End: 2022-08-15

## 2022-08-14 RX ORDER — KETOROLAC TROMETHAMINE 30 MG/ML
30 SYRINGE (ML) INJECTION ONCE
Refills: 0 | Status: DISCONTINUED | OUTPATIENT
Start: 2022-08-14 | End: 2022-08-14

## 2022-08-14 RX ORDER — DIBUCAINE 1 %
1 OINTMENT (GRAM) RECTAL EVERY 6 HOURS
Refills: 0 | Status: DISCONTINUED | OUTPATIENT
Start: 2022-08-14 | End: 2022-08-15

## 2022-08-14 RX ORDER — HYDROCORTISONE 1 %
1 OINTMENT (GRAM) TOPICAL EVERY 6 HOURS
Refills: 0 | Status: DISCONTINUED | OUTPATIENT
Start: 2022-08-14 | End: 2022-08-15

## 2022-08-14 RX ORDER — OXYCODONE HYDROCHLORIDE 5 MG/1
5 TABLET ORAL ONCE
Refills: 0 | Status: DISCONTINUED | OUTPATIENT
Start: 2022-08-14 | End: 2022-08-14

## 2022-08-14 RX ORDER — IBUPROFEN 200 MG
600 TABLET ORAL EVERY 6 HOURS
Refills: 0 | Status: DISCONTINUED | OUTPATIENT
Start: 2022-08-14 | End: 2022-08-15

## 2022-08-14 RX ORDER — TETANUS TOXOID, REDUCED DIPHTHERIA TOXOID AND ACELLULAR PERTUSSIS VACCINE, ADSORBED 5; 2.5; 8; 8; 2.5 [IU]/.5ML; [IU]/.5ML; UG/.5ML; UG/.5ML; UG/.5ML
0.5 SUSPENSION INTRAMUSCULAR ONCE
Refills: 0 | Status: DISCONTINUED | OUTPATIENT
Start: 2022-08-14 | End: 2022-08-15

## 2022-08-14 RX ORDER — DIPHENHYDRAMINE HCL 50 MG
25 CAPSULE ORAL EVERY 6 HOURS
Refills: 0 | Status: DISCONTINUED | OUTPATIENT
Start: 2022-08-14 | End: 2022-08-15

## 2022-08-14 RX ORDER — OXYTOCIN 10 UNIT/ML
333.33 VIAL (ML) INJECTION
Qty: 20 | Refills: 0 | Status: DISCONTINUED | OUTPATIENT
Start: 2022-08-14 | End: 2022-08-15

## 2022-08-14 RX ORDER — MAGNESIUM HYDROXIDE 400 MG/1
30 TABLET, CHEWABLE ORAL
Refills: 0 | Status: DISCONTINUED | OUTPATIENT
Start: 2022-08-14 | End: 2022-08-15

## 2022-08-14 RX ORDER — OXYCODONE HYDROCHLORIDE 5 MG/1
5 TABLET ORAL EVERY 4 HOURS
Refills: 0 | Status: DISCONTINUED | OUTPATIENT
Start: 2022-08-14 | End: 2022-08-15

## 2022-08-14 RX ORDER — PRAMOXINE HYDROCHLORIDE 150 MG/15G
1 AEROSOL, FOAM RECTAL EVERY 4 HOURS
Refills: 0 | Status: DISCONTINUED | OUTPATIENT
Start: 2022-08-14 | End: 2022-08-15

## 2022-08-14 RX ORDER — LANOLIN
1 OINTMENT (GRAM) TOPICAL EVERY 6 HOURS
Refills: 0 | Status: DISCONTINUED | OUTPATIENT
Start: 2022-08-14 | End: 2022-08-15

## 2022-08-14 RX ORDER — SODIUM CHLORIDE 9 MG/ML
3 INJECTION INTRAMUSCULAR; INTRAVENOUS; SUBCUTANEOUS EVERY 8 HOURS
Refills: 0 | Status: DISCONTINUED | OUTPATIENT
Start: 2022-08-14 | End: 2022-08-15

## 2022-08-14 RX ADMIN — Medication 30 MILLIGRAM(S): at 08:25

## 2022-08-14 RX ADMIN — SODIUM CHLORIDE 3 MILLILITER(S): 9 INJECTION INTRAMUSCULAR; INTRAVENOUS; SUBCUTANEOUS at 22:30

## 2022-08-14 RX ADMIN — Medication 600 MILLIGRAM(S): at 18:47

## 2022-08-14 RX ADMIN — OXYCODONE HYDROCHLORIDE 5 MILLIGRAM(S): 5 TABLET ORAL at 20:21

## 2022-08-14 RX ADMIN — OXYCODONE HYDROCHLORIDE 5 MILLIGRAM(S): 5 TABLET ORAL at 21:00

## 2022-08-14 RX ADMIN — Medication 975 MILLIGRAM(S): at 20:20

## 2022-08-14 RX ADMIN — Medication 975 MILLIGRAM(S): at 21:00

## 2022-08-14 RX ADMIN — Medication 1 TABLET(S): at 17:00

## 2022-08-14 RX ADMIN — Medication 600 MILLIGRAM(S): at 17:34

## 2022-08-14 RX ADMIN — Medication 1000 MILLIUNIT(S)/MIN: at 07:59

## 2022-08-14 NOTE — OB PROVIDER TRIAGE NOTE - NSHPPHYSICALEXAM_GEN_ALL_CORE
Vital Signs Last 24 Hrs  T(C): 37.1 (13 Aug 2022 23:28), Max: 37.1 (13 Aug 2022 23:28)  T(F): 98.8 (13 Aug 2022 23:28), Max: 98.8 (13 Aug 2022 23:28)  HR: 83 (13 Aug 2022 23:49) (83 - 83)  BP: 108/58 (13 Aug 2022 23:49) (108/58 - 108/58)      Abdomen soft nontender  TAS: Cephalic presentation, posterior placenta  Speculum: Unable to visualize cervix due to vaginal walls  SVE: 5/70/-3  FHR: 145bpm, moderate variability, accels, no decels   TOCO: Two contraction in two hours Vital Signs Last 24 Hrs  T(C): 37.1 (13 Aug 2022 23:28), Max: 37.1 (13 Aug 2022 23:28)  T(F): 98.8 (13 Aug 2022 23:28), Max: 98.8 (13 Aug 2022 23:28)  HR: 83 (13 Aug 2022 23:49) (83 - 83)  BP: 108/58 (13 Aug 2022 23:49) (108/58 - 108/58)      Abdomen soft nontender  TAS: Cephalic presentation, posterior placenta  Speculum: Unable to visualize cervix due to vaginal walls  SVE: 5/70/-3  FHR: 145bpm, moderate variability, cat 2 tracing.   TOCO: contraction present irregular.

## 2022-08-14 NOTE — OB PROVIDER TRIAGE NOTE - ADDITIONAL INSTRUCTIONS
please drink 1-2 litter of fluid per day  follow up with your next schedule appointment if not in labor.

## 2022-08-14 NOTE — PATIENT PROFILE OB - FALL HARM RISK - UNIVERSAL INTERVENTIONS
Bed in lowest position, wheels locked, appropriate side rails in place/Call bell, personal items and telephone in reach/Instruct patient to call for assistance before getting out of bed or chair/Non-slip footwear when patient is out of bed/[place] to call system/Physically safe environment - no spills, clutter or unnecessary equipment/Purposeful Proactive Rounding/Room/bathroom lighting operational, light cord in reach

## 2022-08-14 NOTE — OB PROVIDER TRIAGE NOTE - NS_OBGYNHISTORY_OBGYN_ALL_OB_FT
X5  7/10/2014 PT D @36wks F 5lbs  2015 FT F 7lbs GDMA1  2017 FT  M GDMA1 7lbs   2019 PTD @36wks M 6lbs GDMA1  2021  FT F 6lbs GDMA1  [facility] D&C X1

## 2022-08-14 NOTE — OB PROVIDER TRIAGE NOTE - HISTORY OF PRESENT ILLNESS
Pt. is a 38y/o  EGA 39 1/ wks was sent in from the office for possible  labor. Pt. states she was at her routine visit and told her OB she was feeling increased pelvic pressure intermittently. Pt. states she was examined and was found to be 4-5 cm dilated. Pt. denies abdominal contractions, leakage of fluid, and vaginal bleeding. Pt. reports good fetal movement.     AP: GDMA1  Medical Hx: Denies  Surgical Hx: D&C   OBGYN Hx:    7/10/2014 @36wks F 5#0   2015 FT F 7#0 GDMA1   2017 FT GDMA1 7#0   2019 @36wks 6#0 GDMA1   2021 6#0 GDMA1  SABx3 D&C X1  and     Meds: ASA and PNV  NKDA

## 2022-08-14 NOTE — OB PROVIDER TRIAGE NOTE - NSOBPROVIDERNOTE_OBGYN_ALL_OB_FT
Pt. is a 36y/o  EGA 39  wks labor  0200 discuss with Dr. Onesimo Ferrera NP. Pt. is a 36y/o  EGA 39 1/7 wks labor  0200 discuss with Dr. Echols  pt to be admitted for Cat. 2 tracing  0205 Dr. Echols at bedside discuss with patient   pt refused medications and want to deliver naturally, pt decided to get  discharged, and come back when her contraction are closer together.   pt explained and verbalized understanding the risk of going home at this stage.   Discharge patient home.  Labor precautions and fetal movements count were reviewed; if not in labor, will follow up with OB with the next schedule appointment  Plan of care was reviewed with patient and family; patient states understanding of the above plan.  In total 35 minutes spent with established/new patient.     DANTE Ferrera NP. Pt. is a 38y/o  EGA 39 1/7 wks labor  0200 discuss with Dr. Echols  pt to be admitted for Cat. 2 tracing  020 Dr. Echols at bedside discuss with patient  pt VE Exam remain the same. /3   pt refused medications and want to deliver naturally, pt decided to get  discharged, and come back when her contraction are closer together.   pt explained and verbalized understanding the risk of going home at this stage.   Discharge patient home.  Labor precautions and fetal movements count were reviewed; if not in labor, will follow up with OB with the next schedule appointment  Plan of care was reviewed with patient and family; patient states understanding of the above plan.  In total 35 minutes spent with established/new patient.     DANTE Ferrera NP.

## 2022-08-15 ENCOUNTER — RESULT REVIEW (OUTPATIENT)
Age: 37
End: 2022-08-15

## 2022-08-15 ENCOUNTER — TRANSCRIPTION ENCOUNTER (OUTPATIENT)
Age: 37
End: 2022-08-15

## 2022-08-15 VITALS
DIASTOLIC BLOOD PRESSURE: 61 MMHG | HEART RATE: 72 BPM | SYSTOLIC BLOOD PRESSURE: 102 MMHG | TEMPERATURE: 98 F | RESPIRATION RATE: 17 BRPM | OXYGEN SATURATION: 97 %

## 2022-08-15 LAB
HCT VFR BLD CALC: 27.6 % — LOW (ref 34.5–45)
HGB BLD-MCNC: 8.8 G/DL — LOW (ref 11.5–15.5)
MCHC RBC-ENTMCNC: 27.8 PG — SIGNIFICANT CHANGE UP (ref 27–34)
MCHC RBC-ENTMCNC: 31.9 GM/DL — LOW (ref 32–36)
MCV RBC AUTO: 87.3 FL — SIGNIFICANT CHANGE UP (ref 80–100)
NRBC # BLD: 0 /100 WBCS — SIGNIFICANT CHANGE UP (ref 0–0)
PLATELET # BLD AUTO: 182 K/UL — SIGNIFICANT CHANGE UP (ref 150–400)
RBC # BLD: 3.16 M/UL — LOW (ref 3.8–5.2)
RBC # FLD: 14 % — SIGNIFICANT CHANGE UP (ref 10.3–14.5)
RUBV IGG SER-ACNC: 21.3 INDEX — SIGNIFICANT CHANGE UP
RUBV IGG SER-IMP: POSITIVE — SIGNIFICANT CHANGE UP
T PALLIDUM AB TITR SER: NEGATIVE — SIGNIFICANT CHANGE UP
WBC # BLD: 10.63 K/UL — HIGH (ref 3.8–10.5)
WBC # FLD AUTO: 10.63 K/UL — HIGH (ref 3.8–10.5)

## 2022-08-15 PROCEDURE — 88307 TISSUE EXAM BY PATHOLOGIST: CPT

## 2022-08-15 PROCEDURE — 85027 COMPLETE CBC AUTOMATED: CPT

## 2022-08-15 PROCEDURE — 82962 GLUCOSE BLOOD TEST: CPT

## 2022-08-15 PROCEDURE — 36415 COLL VENOUS BLD VENIPUNCTURE: CPT

## 2022-08-15 PROCEDURE — 87635 SARS-COV-2 COVID-19 AMP PRB: CPT

## 2022-08-15 PROCEDURE — 86923 COMPATIBILITY TEST ELECTRIC: CPT

## 2022-08-15 PROCEDURE — 86901 BLOOD TYPING SEROLOGIC RH(D): CPT

## 2022-08-15 PROCEDURE — 85610 PROTHROMBIN TIME: CPT

## 2022-08-15 PROCEDURE — 59050 FETAL MONITOR W/REPORT: CPT

## 2022-08-15 PROCEDURE — 86900 BLOOD TYPING SEROLOGIC ABO: CPT

## 2022-08-15 PROCEDURE — 88307 TISSUE EXAM BY PATHOLOGIST: CPT | Mod: 26

## 2022-08-15 PROCEDURE — 87340 HEPATITIS B SURFACE AG IA: CPT

## 2022-08-15 PROCEDURE — 86850 RBC ANTIBODY SCREEN: CPT

## 2022-08-15 PROCEDURE — G0463: CPT

## 2022-08-15 PROCEDURE — 86762 RUBELLA ANTIBODY: CPT

## 2022-08-15 PROCEDURE — 86780 TREPONEMA PALLIDUM: CPT

## 2022-08-15 PROCEDURE — 87389 HIV-1 AG W/HIV-1&-2 AB AG IA: CPT

## 2022-08-15 PROCEDURE — 85730 THROMBOPLASTIN TIME PARTIAL: CPT

## 2022-08-15 PROCEDURE — 59025 FETAL NON-STRESS TEST: CPT

## 2022-08-15 RX ORDER — ASCORBIC ACID 60 MG
500 TABLET,CHEWABLE ORAL DAILY
Refills: 0 | Status: DISCONTINUED | OUTPATIENT
Start: 2022-08-15 | End: 2022-08-15

## 2022-08-15 RX ORDER — SENNOSIDES/DOCUSATE SODIUM 8.6MG-50MG
1 TABLET ORAL
Qty: 60 | Refills: 0
Start: 2022-08-15 | End: 2022-09-13

## 2022-08-15 RX ORDER — SENNA PLUS 8.6 MG/1
2 TABLET ORAL
Qty: 6 | Refills: 0
Start: 2022-08-15 | End: 2022-08-17

## 2022-08-15 RX ORDER — FERROUS SULFATE 325(65) MG
1 TABLET ORAL
Qty: 30 | Refills: 0
Start: 2022-08-15 | End: 2022-09-13

## 2022-08-15 RX ORDER — ACETAMINOPHEN 500 MG
2 TABLET ORAL
Qty: 40 | Refills: 1
Start: 2022-08-15 | End: 2022-08-24

## 2022-08-15 RX ORDER — IBUPROFEN 200 MG
1 TABLET ORAL
Qty: 15 | Refills: 1
Start: 2022-08-15 | End: 2022-08-24

## 2022-08-15 RX ORDER — FERROUS SULFATE 325(65) MG
325 TABLET ORAL DAILY
Refills: 0 | Status: DISCONTINUED | OUTPATIENT
Start: 2022-08-15 | End: 2022-08-15

## 2022-08-15 RX ORDER — BENZOCAINE 10 %
1 GEL (GRAM) MUCOUS MEMBRANE
Qty: 1 | Refills: 2
Start: 2022-08-15 | End: 2022-08-29

## 2022-08-15 RX ORDER — ASPIRIN/CALCIUM CARB/MAGNESIUM 324 MG
0 TABLET ORAL
Qty: 0 | Refills: 0 | DISCHARGE

## 2022-08-15 RX ADMIN — Medication 600 MILLIGRAM(S): at 06:00

## 2022-08-15 RX ADMIN — Medication 600 MILLIGRAM(S): at 17:00

## 2022-08-15 RX ADMIN — Medication 975 MILLIGRAM(S): at 20:34

## 2022-08-15 RX ADMIN — Medication 325 MILLIGRAM(S): at 13:59

## 2022-08-15 RX ADMIN — Medication 600 MILLIGRAM(S): at 05:28

## 2022-08-15 RX ADMIN — Medication 1 TABLET(S): at 13:59

## 2022-08-15 RX ADMIN — OXYCODONE HYDROCHLORIDE 5 MILLIGRAM(S): 5 TABLET ORAL at 03:00

## 2022-08-15 RX ADMIN — SODIUM CHLORIDE 3 MILLILITER(S): 9 INJECTION INTRAMUSCULAR; INTRAVENOUS; SUBCUTANEOUS at 21:09

## 2022-08-15 RX ADMIN — Medication 975 MILLIGRAM(S): at 02:40

## 2022-08-15 RX ADMIN — Medication 975 MILLIGRAM(S): at 03:00

## 2022-08-15 RX ADMIN — SODIUM CHLORIDE 3 MILLILITER(S): 9 INJECTION INTRAMUSCULAR; INTRAVENOUS; SUBCUTANEOUS at 06:57

## 2022-08-15 RX ADMIN — OXYCODONE HYDROCHLORIDE 5 MILLIGRAM(S): 5 TABLET ORAL at 02:40

## 2022-08-15 RX ADMIN — Medication 600 MILLIGRAM(S): at 16:27

## 2022-08-15 RX ADMIN — Medication 500 MILLIGRAM(S): at 13:59

## 2022-08-15 RX ADMIN — Medication 975 MILLIGRAM(S): at 21:10

## 2022-08-15 NOTE — DISCHARGE NOTE OB - HOSPITAL COURSE
extramural delivery    acute blood loss anemia                        8.8    10.63 )-----------( 182      ( 15 Aug 2022 06:45 )             27.6   vss asymp

## 2022-08-15 NOTE — DISCHARGE NOTE OB - SWOLLEN, PAINFUL HOT AREAS AND/OR STREAKS ON THE BREAST
PET CT at [facility] location on September 25 2020 with a 6:30 AM arrival. Patient also has a follow up televisit with Dr. Lyn Hanna 9/29/20 at 1:30. Watch for results. Statement Selected

## 2022-08-15 NOTE — DISCHARGE NOTE OB - NS MD DC FALL RISK RISK
For information on Fall & Injury Prevention, visit: [email or web address] OR  [email or web address] OR  https://www.cdc.gov/steadi/patient.html

## 2022-08-15 NOTE — DISCHARGE NOTE OB - PLAN OF CARE
iron supplement no sex nothing in vagina no heavy lifting no pushing no straining no strenuous activities  pain medication as needed; stool softener; dulcolax as needed if constipated  walk for exercise: helps recovery   continue prenatal vitamins daily especially whole course of breastfeeding  see your OB in the office for follow up post partum check 4-5weeks call the office to set up an appointment

## 2022-08-15 NOTE — PROGRESS NOTE ADULT - SUBJECTIVE AND OBJECTIVE BOX
PA NOTE:  ppd#1 s/p  extramural   in the room  pt doing well                         8.8    10.63 )-----------( 182      ( 15 Aug 2022 06:45 )             27.6   vss asymp    Patient seen at bedside resting comfortably offers no current complaints.  Ambulating and voiding without difficulty.  Passing flatus and tolerating regular diet.  both breast/bottle feeding.  Denies HA, CP, SOB, N/V/D, dizziness, palpitations, worsening abdominal pain, worsening vaginal bleeding, or any other concerns.      Vital Signs Last 24 Hrs  T(C): 36.8 (15 Aug 2022 05:43), Max: 37.1 (14 Aug 2022 18:30)  T(F): 98.2 (15 Aug 2022 05:43), Max: 98.8 (14 Aug 2022 18:30)  HR: 63 (15 Aug 2022 05:43) (63 - 69)  BP: 113/62 (15 Aug 2022 05:43) (90/56 - 113/62)  BP(mean): --  RR: 16 (15 Aug 2022 05:43) (16 - 16)  SpO2: 96% (15 Aug 2022 05:43) (96% - 97%)    Parameters below as of 15 Aug 2022 05:43  Patient On (Oxygen Delivery Method): room air        Physical Exam:     Gen: A&Ox 3, NAD  Chest: CTA B/L  Cardiac: S1+S2; RRR  Breast: Soft, nontender, nonengorged  Abdomen: +BS; Soft, nontender, ND; Fundus firm below umbilicus  Gyn: Min lochia  2nd deg laceration repaired intact   Ext: Nontender, DTRS 2+, no worsening edema

## 2022-08-15 NOTE — PROGRESS NOTE ADULT - ASSESSMENT
PA NOTE:  ppd#1 s/p  extramural   in the room  pt doing well                         8.8    10.63 )-----------( 182      ( 15 Aug 2022 06:45 )             27.6   vss asymp  iron supplement erx   dc home today   ob check [facility]  dc class today

## 2022-08-15 NOTE — LACTATION INITIAL EVALUATION - LACTATION INTERVENTIONS
Reinforced benefits of  exclusive breastfeeding for first 6 months and provided with breastfeeding community resource list for breastfeeding support/assistance available post-discharge and of importance of early f/u with pediatrician within 2-3 days.  Referred to [facility] Tele-lactation program for breastfeeding f/u post-discharge and to [facility] group for breastfeeding support in community as well as WIC; taught hand expression and how to ashwin her (L) nipple which is inverted but does ashwin with stimulation; pt. has been using hand pump as well/initiate/review hand expression/initiate/review pumping guidelines and safe milk handling/initiate/review techniques for position and latch/post discharge community resources provided/review techniques to increase milk supply/initiate/review breast massage/compression/reviewed components of an effective feeding and at least 8 effective feedings per day required/reviewed importance of monitoring infant diapers, the breastfeeding log, and minimum output each day/reviewed risks of unnecessary formula supplementation/reviewed risks of artificial nipples/reviewed benefits and recommendations for rooming in/reviewed feeding on demand/by cue at least 8 times a day/reviewed indications of inadequate milk transfer that would require supplementation

## 2022-08-15 NOTE — DISCHARGE NOTE OB - PATIENT PORTAL LINK FT
You can access the FollowMyHealth Patient Portal offered by [facility] by registering at the following website: [email or web address]. By joining [facility]’s FollowMyHealth portal, you will also be able to view your health information using other applications (apps) compatible with our system.

## 2022-08-15 NOTE — DISCHARGE NOTE OB - MEDICATION SUMMARY - MEDICATIONS TO STOP TAKING
I will STOP taking the medications listed below when I get home from the hospital:    Aspirin Low Dose

## 2022-08-15 NOTE — DISCHARGE NOTE OB - PROVIDER TOKENS
PROVIDER:[TOKEN:[[id number]],FOLLOWUP:[1 month],ESTABLISHEDPATIENT:[T]],FREE:[LAST:[[facility]],FIRST:[Prenatal Care],PHONE:[(   )    -],FAX:[(   )    -],FOLLOWUP:[1 month],ESTABLISHEDPATIENT:[T]]

## 2022-08-15 NOTE — DISCHARGE NOTE OB - MATERIALS PROVIDED
Vaccinations/[facility]  Screening Program/  Immunization Record/Breastfeeding Mother’s Support Group Information/Guide to Postpartum Care/[facility] Hearing Screen Program/Back To Sleep Handout/Shaken Baby Prevention Handout/Birth Certificate Instructions/Discharge Medication Information for Patients and Families Pocket Guide/Letter of Medical Neccessity

## 2022-08-15 NOTE — DISCHARGE NOTE OB - MEDICATION SUMMARY - MEDICATIONS TO TAKE
I will START or STAY ON the medications listed below when I get home from the hospital:    acetaminophen 500 mg oral capsule  -- 2 cap(s) by mouth every 6 hours, As Needed   -- This product contains acetaminophen.  Do not use  with any other product containing acetaminophen to prevent possible liver damage.    -- Indication: For for pain    ibuprofen 800 mg oral tablet  -- 1 tab(s) by mouth every 8 hours   -- Do not take this drug if you are pregnant.  It is very important that you take or use this exactly as directed.  Do not skip doses or discontinue unless directed by your doctor.  May cause drowsiness or dizziness.  Obtain medical advice before taking any non-prescription drugs as some may affect the action of this medication.  Take with food or milk.    -- Indication: For for pain    benzocaine 20% topical spray  -- 1 spray(s) on skin every 6 hours, As needed, for Perineal discomfort  -- Indication: For for perinuem    ferrous sulfate (as elemental iron) 45 mg oral tablet, extended release  -- 1 tab(s) by mouth once a day   -- Check with your doctor before becoming pregnant.  May discolor urine or feces.  Some non-prescription drugs may aggravate your condition.  Read all labels carefully.  If a warning appears, check with your doctor before taking.  Swallow whole.  Do not crush.    -- Indication: For Anemia due to acute blood loss    Prenatal 1  -- orally once a day  -- Indication: For supplement    senna oral tablet  -- 2 tab(s) by mouth once a day, As Needed -for constipation   -- Indication: For for bm    Colace 2-in-1 50 mg-8.6 mg oral tablet  -- 1 tab(s) by mouth 2 times a day   -- Medication should be taken with plenty of water.    -- Indication: For for bm

## 2022-08-15 NOTE — DISCHARGE NOTE OB - CARE PROVIDER_API CALL
DISCHARGE Celsa Azevedo)  Obstetrics and Gynecology  [address] [facility] [place] [address]  Phone: [phone]  Fax: [phone]  Established Patient  Follow Up Time: 1 month    [facility], Prenatal Care  Phone: (   )    -  Fax: (   )    -  Established Patient  Follow Up Time: 1 month

## 2022-08-19 PROBLEM — O03.9 COMPLETE OR UNSPECIFIED SPONTANEOUS ABORTION WITHOUT COMPLICATION: Chronic | Status: INACTIVE | Noted: 2020-12-31 | Resolved: 2022-08-13

## 2022-08-19 PROBLEM — O24.410 GESTATIONAL DIABETES MELLITUS IN PREGNANCY, DIET CONTROLLED: Chronic | Status: INACTIVE | Noted: 2021-02-26 | Resolved: 2022-08-13

## 2022-09-29 LAB — SURGICAL PATHOLOGY STUDY: SIGNIFICANT CHANGE UP

## 2022-10-21 NOTE — OB RN PATIENT PROFILE - NS_PRENATALLABSOURCEGBS36PN_OBGYN_ALL_OB
Please inform patient that his kidney check was within normal limits  He should continue with current management and he should have the rest of the blood work  Thank you  2/12/21/negative

## 2022-10-26 ENCOUNTER — RX RENEWAL (OUTPATIENT)
Age: 37
End: 2022-10-26

## 2022-10-28 ENCOUNTER — RX RENEWAL (OUTPATIENT)
Age: 37
End: 2022-10-28

## 2023-07-16 NOTE — OB PROVIDER TRIAGE NOTE - ADDITIONAL INSTRUCTIONS
-Patient will follow up with next scheduled appointment   -Urine culture pending, will follow up with any positive results  - labor precautions reviewed  -Fetal kick counts reviewed  -Patient to increase hydration  -Written and verbal instructions given to patient, patient verbalizes understanding of all information given impaired balance/decreased strength

## 2023-09-13 NOTE — OB RN DELIVERY SUMMARY - BABY A: APGAR 5 MIN RESP RATE, DELIVERY
(2) good, crying Complex Repair And Single Advancement Flap Text: The defect edges were debeveled with a #15 scalpel blade.  The primary defect was closed partially with a complex linear closure.  Given the location of the remaining defect, shape of the defect and the proximity to free margins a single advancement flap was deemed most appropriate for complete closure of the defect.  Using a sterile surgical marker, an appropriate advancement flap was drawn incorporating the defect and placing the expected incisions within the relaxed skin tension lines where possible.    The area thus outlined was incised deep to adipose tissue with a #15 scalpel blade.  The skin margins were undermined to an appropriate distance in all directions utilizing iris scissors.

## 2024-03-13 NOTE — DISCHARGE NOTE OB - YOU MAY BE A PASSENGER IN A CAR.  BE SURE TO GET OUT OF THE CAR AND STRETCH IF YOU TRAVEL FOR A LONG PERIOD OF TIME
Attempted to schedule patient for pulm appt per referral. Patient said do not call again she does not have lung issues. Did not understand why her referral was placed. Said she already has enough appt. Closed referral for refusing services.  
Statement Selected

## 2024-04-22 NOTE — OB NEONATOLOGY/PEDIATRICIAN DELIVERY SUMMARY - APGAR COMPLETED BY
Drivers Assessments report has been received from [facility], report placed in Dr. Da Silva's folder for review and signature.  Ben Houston EMT April 22, 2024      NNP

## 2024-10-16 ENCOUNTER — NON-APPOINTMENT (OUTPATIENT)
Age: 39
End: 2024-10-16

## 2024-11-12 NOTE — DISCHARGE NOTE OB - CARE PLAN
No Principal Discharge DX:	Normal spontaneous vaginal delivery  Assessment and plan of treatment:	no sex nothing in vagina no heavy lifting no pushing no straining no strenuous activities  pain medication as needed; stool softener; dulcolax as needed if constipated  walk for exercise: helps recovery   continue prenatal vitamins daily especially whole course of breastfeeding  see your OB in the office for follow up post partum check 4-5weeks call the office to set up an appointment  Secondary Diagnosis:	Anemia due to acute blood loss  Assessment and plan of treatment:	iron supplement   1

## 2025-02-26 NOTE — OB PROVIDER TRIAGE NOTE - NS_VISITREASON1_OBGYN_ALL_OB
Dr. López' Discharge Instructions    Return to clinic 7 days.    Regular diet that you were on pre-operatively    No lifting, pushing, pulling, greater than 10-20 pounds for 1 weeks.    No driving until off pain medications and taking stairs without difficulty.    May removed bandages and shower over wounds after 48 hours.    Call my office, [phone], if you develop signs or symptoms of wound infection, such as increased pain, swelling, redness, or drainage from the incision, or fever greater than 101°.    To call physician for difficulty breathing,vomiting,inability to tolerate oral intake      Physician: Dr. Дмитрий James   Office Phone: [phone]                        Office Address:         [facility], [address]      [place] [id number]             Labor

## 2025-03-05 ENCOUNTER — NON-APPOINTMENT (OUTPATIENT)
Age: 40
End: 2025-03-05
